# Patient Record
Sex: FEMALE | Race: WHITE | NOT HISPANIC OR LATINO | URBAN - METROPOLITAN AREA
[De-identification: names, ages, dates, MRNs, and addresses within clinical notes are randomized per-mention and may not be internally consistent; named-entity substitution may affect disease eponyms.]

---

## 2017-01-12 ENCOUNTER — OFFICE VISIT (OUTPATIENT)
Dept: FAMILY MEDICINE | Facility: CLINIC | Age: 47
End: 2017-01-12
Payer: COMMERCIAL

## 2017-01-12 VITALS
HEART RATE: 79 BPM | SYSTOLIC BLOOD PRESSURE: 124 MMHG | DIASTOLIC BLOOD PRESSURE: 82 MMHG | OXYGEN SATURATION: 97 % | TEMPERATURE: 97.1 F

## 2017-01-12 DIAGNOSIS — H54.7 BLIND: Primary | ICD-10-CM

## 2017-01-12 PROCEDURE — 99213 OFFICE O/P EST LOW 20 MIN: CPT | Performed by: FAMILY MEDICINE

## 2017-01-12 NOTE — PROGRESS NOTES
SUBJECTIVE:                                                    Tresa Orellana is a 46 year old female who presents to clinic today for the following health issues:      Patient is here to have forms filled out for [employer].    She is legally blind in both eyes and would have trouble with public transportation identifying correct routes and unfamiliar surroundings. She uses a service dog and also a white cane. She is completely blind in one eye and has vision of 10/800 in  the other eye.    Problem list and histories reviewed & adjusted, as indicated.  Additional history: as documented    Patient Active Problem List   Diagnosis     CARDIOVASCULAR SCREENING; LDL GOAL LESS THAN 160     Eczema     Chronic rhinitis     Seasonal allergies     Family history of ischemic heart disease     Legal blindness, ou     Thyroid nodules     Iron deficiency anemia due to chronic blood loss     Obesity (BMI 35.0-39.9 without comorbidity) (H)     Chalazion, right     Past Surgical History   Procedure Laterality Date     C corneal transplant,lamellar  18 mos     rejected     Bilat knee surgery torn ligaments  age 15     Hysterectomy, candie  7/29/2014     CANDIE/BS  ovaries conserved.       Social History   Substance Use Topics     Smoking status: Never Smoker      Smokeless tobacco: Never Used     Alcohol Use: Yes      Comment: occassional     Family History   Problem Relation Age of Onset     Cardiovascular Mother 63     mi         Current Outpatient Prescriptions   Medication Sig Dispense Refill     triamcinolone (KENALOG) 0.1 % cream Apply to affected area twice daily as needed 30 g 2     IBUPROFEN PO Take 200 mg by mouth 3 times daily as needed        Fexofenadine HCl (ALLEGRA PO) Take by mouth daily       [DISCONTINUED] predniSONE (DELTASONE) 20 MG tablet Take 1 tablet by mouth 2 times daily. 10 tablet 0     Allergies   Allergen Reactions     Amoxicillin      Codeine      Penicillins        ROS:  Noncontributory except as  above    OBJECTIVE:                                                    /82 mmHg  Pulse 79  Temp(Src) 97.1  F (36.2  C) (Oral)  Ht   Wt   SpO2 97%  LMP 06/22/2014  There is no weight on file to calculate BMI.  GENERAL: alert, no distress and over weight  EYES: she is blind    Diagnostic Test Results:  none      ASSESSMENT/PLAN:                                                        ICD-10-CM    1. Legal blindness, ou H54.0      Her [employer] forms were completed for her at this visit with her assistance  Recheck prn    15 minute visit with over half the time spent completing paperwork and/or coordinating care      Aneesh Castelan MD  [facility]

## 2017-01-12 NOTE — NURSING NOTE
"Chief Complaint   Patient presents with     Forms     Recertification forms, [employer]     Health Maintenance     Flu shot       Initial /82 mmHg  Pulse 79  Temp(Src) 97.1  F (36.2  C) (Oral)  Ht   Wt   SpO2 97%  LMP 06/22/2014 Estimated body mass index is 38.55 kg/(m^2) as calculated from the following:    Height as of 4/12/16: 5' 4.5\" (1.638 m).    Weight as of 4/12/16: 228 lb (103.42 kg).  BP completed using cuff size: small regular right wrist  Karina Tirado CMA       "

## 2017-01-12 NOTE — MR AVS SNAPSHOT
After Visit Summary   1/12/2017    Tresa Orellana    MRN: [id number]           Patient Information     Date Of Birth          1970        Visit Information        Provider Department      1/12/2017 10:40 AM Aneesh Castelan MD [facility]        Today's Diagnoses     Legal blindness, ou    -  1        Follow-ups after your visit        Who to contact     If you have questions or need follow up information about today's clinic visit or your schedule please contact [facility] directly at [phone].  Normal or non-critical lab and imaging results will be communicated to you by MyChart, letter or phone within 4 business days after the clinic has received the results. If you do not hear from us within 7 days, please contact the clinic through [employer] or phone. If you have a critical or abnormal lab result, we will notify you by phone as soon as possible.  Submit refill requests through [employer] or call your pharmacy and they will forward the refill request to us. Please allow 3 business days for your refill to be completed.          Additional Information About Your Visit        MyChart Information     [employer] gives you secure access to your electronic health record. If you see a primary care provider, you can also send messages to your care team and make appointments. If you have questions, please call your primary care clinic.  If you do not have a primary care provider, please call [phone] and they will assist you.        Care EveryWhere ID     This is your Care EveryWhere ID. This could be used by other organizations to access your [facility] medical records  [id number]        Your Vitals Were     Pulse Temperature Pulse Oximetry Last Period          79 97.1  F (36.2  C) (Oral) 97% 06/22/2014         Blood Pressure from Last 3 Encounters:   01/12/17 124/82   04/12/16 123/79   08/26/14 125/81    Weight from Last 3 Encounters:   04/12/16 228 lb  (103.42 kg)   08/26/14 209 lb (94.802 kg)   07/25/14 207 lb (93.895 kg)              Today, you had the following     No orders found for display       Primary Care Provider Office Phone # Fax #    Aneesh Castelan [phone]       [facility] [address]  [facility] [address]        Thank you!     Thank you for choosing [facility]  for your care. Our goal is always to provide you with excellent care. Hearing back from our patients is one way we can continue to improve our services. Please take a few minutes to complete the written survey that you may receive in the mail after your visit with us. Thank you!             Your Updated Medication List - Protect others around you: Learn how to safely use, store and throw away your medicines at www.disposemymeds.org.          This list is accurate as of: 1/12/17 11:14 AM.  Always use your most recent med list.                   Brand Name Dispense Instructions for use    ALLEGRA PO      Take by mouth daily       IBUPROFEN PO      Take 200 mg by mouth 3 times daily as needed       triamcinolone 0.1 % cream    KENALOG    30 g    Apply to affected area twice daily as needed

## 2018-11-21 ENCOUNTER — OFFICE VISIT (OUTPATIENT)
Dept: FAMILY MEDICINE | Facility: CLINIC | Age: 48
End: 2018-11-21
Payer: COMMERCIAL

## 2018-11-21 VITALS
HEIGHT: 65 IN | HEART RATE: 73 BPM | BODY MASS INDEX: 38.49 KG/M2 | OXYGEN SATURATION: 96 % | SYSTOLIC BLOOD PRESSURE: 126 MMHG | TEMPERATURE: 97.6 F | DIASTOLIC BLOOD PRESSURE: 83 MMHG | WEIGHT: 231 LBS

## 2018-11-21 DIAGNOSIS — H54.7 BLIND: ICD-10-CM

## 2018-11-21 DIAGNOSIS — Z23 NEED FOR PROPHYLACTIC VACCINATION WITH TETANUS-DIPHTHERIA (TD): ICD-10-CM

## 2018-11-21 DIAGNOSIS — L30.9 ECZEMA, UNSPECIFIED TYPE: ICD-10-CM

## 2018-11-21 DIAGNOSIS — E04.1 THYROID NODULE: ICD-10-CM

## 2018-11-21 DIAGNOSIS — Z23 NEED FOR PROPHYLACTIC VACCINATION AND INOCULATION AGAINST INFLUENZA: ICD-10-CM

## 2018-11-21 DIAGNOSIS — Z11.4 SCREENING FOR HIV (HUMAN IMMUNODEFICIENCY VIRUS): ICD-10-CM

## 2018-11-21 DIAGNOSIS — M25.571 PAIN IN JOINT, ANKLE AND FOOT, RIGHT: ICD-10-CM

## 2018-11-21 DIAGNOSIS — E66.9 OBESITY (BMI 35.0-39.9 WITHOUT COMORBIDITY): ICD-10-CM

## 2018-11-21 DIAGNOSIS — Z00.00 ROUTINE GENERAL MEDICAL EXAMINATION AT A HEALTH CARE FACILITY: Primary | ICD-10-CM

## 2018-11-21 LAB
CHOLEST SERPL-MCNC: 179 MG/DL
ERYTHROCYTE [DISTWIDTH] IN BLOOD BY AUTOMATED COUNT: 13.3 % (ref 10–15)
GLUCOSE SERPL-MCNC: 82 MG/DL (ref 70–99)
HBA1C MFR BLD: 4.9 % (ref 0–5.6)
HCT VFR BLD AUTO: 43.6 % (ref 35–47)
HDLC SERPL-MCNC: 68 MG/DL
HGB BLD-MCNC: 14.5 G/DL (ref 11.7–15.7)
LDLC SERPL CALC-MCNC: 97 MG/DL
MCH RBC QN AUTO: 28.7 PG (ref 26.5–33)
MCHC RBC AUTO-ENTMCNC: 33.3 G/DL (ref 31.5–36.5)
MCV RBC AUTO: 86 FL (ref 78–100)
NONHDLC SERPL-MCNC: 111 MG/DL
PLATELET # BLD AUTO: 230 10E9/L (ref 150–450)
RBC # BLD AUTO: 5.06 10E12/L (ref 3.8–5.2)
TRIGL SERPL-MCNC: 71 MG/DL
TSH SERPL DL<=0.005 MIU/L-ACNC: 1.94 MU/L (ref 0.4–4)
WBC # BLD AUTO: 5.7 10E9/L (ref 4–11)

## 2018-11-21 PROCEDURE — 83036 HEMOGLOBIN GLYCOSYLATED A1C: CPT | Performed by: FAMILY MEDICINE

## 2018-11-21 PROCEDURE — 87389 HIV-1 AG W/HIV-1&-2 AB AG IA: CPT | Performed by: FAMILY MEDICINE

## 2018-11-21 PROCEDURE — 85027 COMPLETE CBC AUTOMATED: CPT | Performed by: FAMILY MEDICINE

## 2018-11-21 PROCEDURE — 36415 COLL VENOUS BLD VENIPUNCTURE: CPT | Performed by: FAMILY MEDICINE

## 2018-11-21 PROCEDURE — 90472 IMMUNIZATION ADMIN EACH ADD: CPT | Performed by: FAMILY MEDICINE

## 2018-11-21 PROCEDURE — 99213 OFFICE O/P EST LOW 20 MIN: CPT | Mod: 25 | Performed by: FAMILY MEDICINE

## 2018-11-21 PROCEDURE — 82947 ASSAY GLUCOSE BLOOD QUANT: CPT | Performed by: FAMILY MEDICINE

## 2018-11-21 PROCEDURE — 90715 TDAP VACCINE 7 YRS/> IM: CPT | Performed by: FAMILY MEDICINE

## 2018-11-21 PROCEDURE — 84443 ASSAY THYROID STIM HORMONE: CPT | Performed by: FAMILY MEDICINE

## 2018-11-21 PROCEDURE — 90471 IMMUNIZATION ADMIN: CPT | Performed by: FAMILY MEDICINE

## 2018-11-21 PROCEDURE — 80061 LIPID PANEL: CPT | Performed by: FAMILY MEDICINE

## 2018-11-21 PROCEDURE — 90686 IIV4 VACC NO PRSV 0.5 ML IM: CPT | Performed by: FAMILY MEDICINE

## 2018-11-21 PROCEDURE — 99396 PREV VISIT EST AGE 40-64: CPT | Mod: 25 | Performed by: FAMILY MEDICINE

## 2018-11-21 RX ORDER — TRIAMCINOLONE ACETONIDE 1 MG/G
CREAM TOPICAL
Qty: 30 G | Refills: 2 | Status: SHIPPED | OUTPATIENT
Start: 2018-11-21 | End: 2018-12-18

## 2018-11-21 NOTE — PROGRESS NOTES
SUBJECTIVE:   CC: Tresa Orellana is an 48 year old woman who presents for a preventive health visit and to address a couple of baseline health conditions.     Healthy Habits:    Do you get at least three servings of calcium containing foods daily (dairy, green leafy vegetables, etc.)? yes    Amount of exercise or daily activities, outside of work: walking    Problems taking medications regularly not applicable    Medication side effects: No    Have you had an eye exam in the past two years? no    Do you see a dentist twice per year? Once a year    Do you have sleep apnea, excessive snoring or daytime drowsiness?no    Other concerns:   Right ankle is sometimes painful and a little swollen. She banged her right foot and ankle against a [employer] reader a while back and it became quickly swollen then.  She can walk on it okay, but it sometimes feels a little sore.  She does a lot of walking to get around because she is not able to drive given her blindness.    She still uses triamcinolone cream periodically behind her ears for eczema and requests a refill on that.    Today's PHQ-2 Score:   PHQ-2 ( 1999 Pf[employer]) 11/21/2018 4/12/2016   Q1: Little interest or pleasure in doing things 0 0   Q2: Feeling down, depressed or hopeless 0 0   PHQ-2 Score 0 0   Q1: Little interest or pleasure in doing things - -   Q2: Feeling down, depressed or hopeless - -   PHQ-2 Score - -       Abuse: Current or Past(Physical, Sexual or Emotional)- No  Do you feel safe in your environment - Yes    Social History   Substance Use Topics     Smoking status: Never Smoker     Smokeless tobacco: Never Used     Alcohol use Yes      Comment: occassional     If you drink alcohol do you typically have >3 drinks per day or >7 drinks per week? No                     Reviewed orders with patient.  Reviewed health maintenance and updated orders accordingly - Yes  Patient Active Problem List   Diagnosis     Eczema     Chronic rhinitis     Seasonal allergies      Family history of ischemic heart disease     Legal blindness, ou     Thyroid nodules     Obesity (BMI 35.0-39.9 without comorbidity)     Past Surgical History:   Procedure Laterality Date     bilat knee surgery torn ligaments  age 15     C CORNEAL TRANSPLANT,LAMELLAR  18 mos    rejected     HYSTERECTOMY, [facility]  7/29/2014    CANDIE/BS  ovaries conserved.       Social History   Substance Use Topics     Smoking status: Never Smoker     Smokeless tobacco: Never Used     Alcohol use Yes      Comment: occassional     Family History   Problem Relation Age of Onset     Cardiovascular Mother 63     mi         Current Outpatient Prescriptions   Medication Sig Dispense Refill     IBUPROFEN PO Take 200 mg by mouth 3 times daily as needed        triamcinolone (KENALOG) 0.1 % cream Apply to affected area twice daily as needed 30 g 2     Fexofenadine HCl (ALLEGRA PO) Take by mouth daily       [DISCONTINUED] predniSONE (DELTASONE) 20 MG tablet Take 1 tablet by mouth 2 times daily. 10 tablet 0     Allergies   Allergen Reactions     Amoxicillin      Codeine      Penicillins        Patient over age 50, mutual decision to screen reflected in health maintenance.    Pertinent mammograms are reviewed under the imaging tab.  History of abnormal Pap smear: Status post benign hysterectomy. Health Maintenance and Surgical History updated.  PAP / HPV 6/27/2014 6/2/2011   PAP NIL NIL     Reviewed and updated as needed this visit by clinical staff  Tobacco  Allergies  Meds  Med Hx  Surg Hx  Fam Hx  Soc Hx        Reviewed and updated as needed this visit by Provider            ROS:  CONSTITUTIONAL: NEGATIVE for fever, chills, change in weight  INTEGUMENTARY/SKIN: See above for eczema behind the ears  EYES: She is legally blind  ENT: NEGATIVE for ear, mouth and throat problems  RESP: NEGATIVE for significant cough or SOB  BREAST: NEGATIVE for masses, tenderness or discharge  CV: NEGATIVE for chest pain, palpitations or peripheral edema  GI:  "NEGATIVE for nausea, abdominal pain, heartburn, or change in bowel habits  : NEGATIVE for unusual urinary or vaginal symptoms. No vaginal bleeding.  MUSCULOSKELETAL: See above  NEURO: NEGATIVE for weakness, dizziness or paresthesias  PSYCHIATRIC: NEGATIVE for changes in mood or affect     As a part of this visit, I reviewed her old records.  In August 2013 she had a thyroid ultrasound.  She was seeing Dr. Lowery at the time.  2 nodules were found.  About that same time she broke her foot and was seen by orthopedics.  The thyroid nodules were \"put on the back burner\", but  it does not look like the thyroid nodules were ever really followed up on.    OBJECTIVE:   /83 (BP Location: Right arm, Patient Position: Chair, Cuff Size: Child)  Pulse 73  Temp 97.6  F (36.4  C) (Oral)  Ht 5' 4.57\" (1.64 m)  Wt 231 lb (104.8 kg)  LMP 06/22/2014  SpO2 96%  BMI 38.96 kg/m2  EXAM:  GENERAL: alert, no distress and obese  EYES: She has blind  HENT: ear canals and TM's normal, nose and mouth without ulcers or lesions  NECK: no adenopathy, no asymmetry, masses, or scars.  Generous sized left lobe of thyroid.  RESP: lungs clear to auscultation - no rales, rhonchi or wheezes  BREAST: normal without masses, tenderness or nipple discharge and no palpable axillary masses or adenopathy  CV: regular rate and rhythm, normal S1 S2, no S3 or S4, no murmur, click or rub, no peripheral edema and peripheral pulses strong  ABDOMEN: soft, nontender, no hepatosplenomegaly, no masses   MS: no gross musculoskeletal defects noted, no edema  SKIN: She has some mild erythematous flaky/scaly areas behind her ears  NEURO: Normal strength and tone, mentation intact and speech normal  PSYCH: mentation appears normal, affect normal/bright    Diagnostic Test Results:  none     ASSESSMENT/PLAN:       ICD-10-CM    1. Routine general medical examination at a health care facility Z00.00 GLUCOSE     CBC with platelets     Hemoglobin A1c     Lipid panel " "reflex to direct LDL Fasting     MA Screening Digital Bilateral   2. Thyroid nodules E04.1 TSH with free T4 reflex     US Thyroid   3. Eczema, unspecified type L30.9 triamcinolone (KENALOG) 0.1 % cream   4. Legal blindness, ou H54.7    5. Obesity (BMI 35.0-39.9 without comorbidity) E66.9    6. Pain in joint, ankle and foot, right M25.571    7. Screening for HIV (human immunodeficiency virus) Z11.4 HIV Screening   8. Need for prophylactic vaccination with tetanus-diphtheria (Td) Z23      ADMIN VACCINE, FIRST     TDAP VACCINE (ADACEL)   9. Need for prophylactic vaccination and inoculation against influenza Z23 FLU VACCINE, SPLIT VIRUS, IM (QUADRIVALENT) [23876]- >3 YRS     Vaccine Administration, Initial [56573]     VACCINE ADMINISTRATION, EACH ADDITIONAL     Blood pressure and other vital signs look fine  We will check fasting labs today as above  We will give her a flu shot and tetanus booster  We will get her set up for a screening mammogram  Discussed having her undergo a colonoscopy when she turns age 49  I refilled her triamcinolone cream  Discussed some exercises for her right foot and ankle  I also discussed her history of thyroid nodules          I suspect they are benign given the 5-year span since they were last looked at, but we will check a repeat thyroid ultrasound and also check a TSH to further evaluate them         Discussed possible role for fine-needle aspiration if they are indeterminate    Plan a tentative recheck in 1 year, or sooner prn    COUNSELING:   Reviewed preventive health counseling, as reflected in patient instructions       Regular exercise       Healthy diet/nutrition       Immunizations    Vaccinated for: Influenza and TDAP          BP Readings from Last 1 Encounters:   11/21/18 126/83     Estimated body mass index is 38.96 kg/(m^2) as calculated from the following:    Height as of this encounter: 5' 4.57\" (1.64 m).    Weight as of this encounter: 231 lb (104.8 kg).      Weight " management plan: Discussed healthy diet and exercise guidelines and patient will follow up in 12 months in clinic to re-evaluate.     reports that she has never smoked. She has never used smokeless tobacco.      Counseling Resources:  ATP IV Guidelines  Pooled Cohorts Equation Calculator  Breast Cancer Risk Calculator  FRAX Risk Assessment  ICSI Preventive Guidelines  Dietary Guidelines for Americans, 2010  USDA's MyPlate  ASA Prophylaxis  Lung CA Screening    Aneesh Castelan MD  [facility]    Injectable Influenza Immunization Documentation    1.  Is the person to be vaccinated sick today?   No    2. Does the person to be vaccinated have an allergy to a component   of the vaccine?   No  Egg Allergy Algorithm Link    3. Has the person to be vaccinated ever had a serious reaction   to influenza vaccine in the past?   No    4. Has the person to be vaccinated ever had Guillain-Barré syndrome?   No    Form completed by Patient  Vaccine information supplied.  Due to injection administration, patient instructed to remain in clinic for 15 minutes  afterwards, and to report any adverse reaction to me immediately.

## 2018-11-21 NOTE — NURSING NOTE
Screening Questionnaire for Adult Immunization    Are you sick today?   No   Do you have allergies to medications, food, a vaccine component or latex?   No   Have you ever had a serious reaction after receiving a vaccination?   No   Do you have a long-term health problem with heart disease, lung disease, asthma, kidney disease, metabolic disease (e.g. diabetes), anemia, or other blood disorder?   No   Do you have cancer, leukemia, HIV/AIDS, or any other immune system problem?   No   In the past 3 months, have you taken medications that affect  your immune system, such as prednisone, other steroids, or anticancer drugs; drugs for the treatment of rheumatoid arthritis, Crohn s disease, or psoriasis; or have you had radiation treatments?   No   Have you had a seizure, or a brain or other nervous system problem?   No   During the past year, have you received a transfusion of blood or blood     products, or been given immune (gamma) globulin or antiviral drug?   No   For women: Are you pregnant or is there a chance you could become        pregnant during the next month?   No   Have you received any vaccinations in the past 4 weeks?   No     Immunization questionnaire answers were all negative.        Per orders of Dr. Castelan, injection of Tdap given by Karina Tirado. Patient instructed to remain in clinic for 15 minutes afterwards, and to report any adverse reaction to me immediately.  Vaccine information supplied.    Screening performed by Karina Tirado on 11/21/2018 at 10:26 AM.

## 2018-11-21 NOTE — MR AVS SNAPSHOT
After Visit Summary   11/21/2018    Tresa Orellana    MRN: [id number]           Patient Information     Date Of Birth          1970        Visit Information        Provider Department      11/21/2018 9:40 AM Aneesh Castelan MD [facility]        Today's Diagnoses     Routine general medical examination at a health care facility    -  1    Thyroid nodules        Eczema, unspecified type        Legal blindness, ou        Obesity (BMI 35.0-39.9 without comorbidity)        Pain in joint, ankle and foot, right        Screening for HIV (human immunodeficiency virus)        Need for prophylactic vaccination with tetanus-diphtheria (Td)        Need for prophylactic vaccination and inoculation against influenza          Care Instructions      Preventive Health Recommendations  Female Ages 40 to 49    Yearly exam:     See your health care provider every year in order to  1. Review health changes.   2. Discuss preventive care.    3. Review your medicines if your doctor prescribed any.      Get a Pap test every three years (unless you have an abnormal result and your provider advises testing more often).      If you get Pap tests with HPV test, you only need to test every 5 years, unless you have an abnormal result. You do not need a Pap test if your uterus was removed (hysterectomy) and you have not had cancer.      You should be tested each year for STDs (sexually transmitted diseases), if you're at risk.     Ask your doctor if you should have a mammogram.      Have a colonoscopy (test for colon cancer) if someone in your family has had colon cancer or polyps before age 50.       Have a cholesterol test every 5 years.       Have a diabetes test (fasting glucose) after age 45. If you are at risk for diabetes, you should have this test every 3 years.    Shots: Get a flu shot each year. Get a tetanus shot every 10 years.     Nutrition:     Eat at least 5 servings of fruits and  vegetables each day.    Eat whole-grain bread, whole-wheat pasta and brown rice instead of white grains and rice.    Get adequate Calcium and Vitamin D.      Lifestyle    Exercise at least 150 minutes a week (an average of 30 minutes a day, 5 days a week). This will help you control your weight and prevent disease.    Limit alcohol to one drink per day.    No smoking.     Wear sunscreen to prevent skin cancer.    See your dentist every six months for an exam and cleaning.          Follow-ups after your visit        Follow-up notes from your care team     Return in about 1 year (around 11/21/2019).      Your next 10 appointments already scheduled     Nov 28, 2018  9:00 AM CST   US THYROID with FKUS1   [facility]    [address] [place] [address] [phone]           How do I prepare for my exam? (Food and drink instructions) No Food and Drink Restrictions.  How do I prepare for my exam? (Other instructions) You do not need to do anything special to prepare for your exam.  What should I wear: Wear comfortable clothes.  How long does the exam take: Most ultrasounds take 30 to 60 minutes.  What should I bring: Bring a list of your medicines, including vitamins, minerals and over-the-counter drugs. It is safest to leave personal items at home.  Do I need a :  No  is needed.  What do I need to tell my doctor: Tell your doctor about any allergies you may have.  What should I do after the exam: No restrictions, You may resume normal activities.  What is this test: An ultrasound uses sound waves to make pictures of the body. Sound waves do not cause pain. The only discomfort may be the pressure of the wand against your skin or full bladder.  Who should I call with questions: If you have any questions, please call the Imaging Department where you will have your exam. Directions, parking instructions, and other information is available on our website,  [email or web address].            Dec 03, 2018  8:30 AM CST   MA SCREENING DIGITAL BILATERAL with CPMA1   [facility] ([facility]    [address]  [facility] [id number] [phone]           How do I prepare for my exam? (Food and drink instructions) No Food and Drink Restrictions.  How do I prepare for my exam? (Other instructions) Do not use any powder, lotion or deodorant under your arms or on your breast. If you do, we will ask you to remove it before your exam.  What should I wear: Wear comfortable, two-piece clothing.  How long does the exam take: Most scans will take 15 minutes.  What should I bring: Bring any previous mammograms from other facilities or have them mailed to the breast center.  Do I need a :  No  is needed.  What do I need to tell my doctor: If you have any allergies, tell your care team.  What should I do after the exam: No restrictions, You may resume normal activities.  What is this test: This test is an x-ray of the breast to look for breast disease. The breast is pressed between two plates to flatten and spread the tissue. An X-ray is taken of the breast from different angles.  Who should I call with questions: If you have any questions, please call the Imaging Department where you will have your exam. Directions, parking instructions, and other information is available on our website, [email or web address].  Other information about my exam Three-dimensional (3D) mammograms are available at [facility] locations in [facility], [facility], [facility], [place], [facility] and [place]. [facility] locations include [place] and the [facility] in [place].  Benefits of 3D mammograms include * Improved rate of cancer detection * Decreases your chance of having to go back for more tests, which means fewer: * \"False-positive\" results (This means that there is an abnormal area but it " isn't cancer.) * Invasive testing procedures, such as a biopsy or surgery * Can provide clearer images of the breast if you have dense breast tissue.  *3D mammography is an optional exam that anyone can have with a 2D mammogram. It doesn't replace or take the place of a 2D mammogram. 2D mammograms remain an effective screening test for all women.  Not all insurance companies cover the cost of a 3D mammogram. Check with your insurance.              Future tests that were ordered for you today     Open Future Orders        Priority Expected Expires Ordered    US Thyroid Routine  11/21/2019 11/21/2018    MA Screening Digital Bilateral Routine  11/21/2019 11/21/2018            Who to contact     If you have questions or need follow up information about today's clinic visit or your schedule please contact [facility] directly at [phone].  Normal or non-critical lab and imaging results will be communicated to you by Uzabasehart, letter or phone within 4 business days after the clinic has received the results. If you do not hear from us within 7 days, please contact the clinic through AI Exchanget or phone. If you have a critical or abnormal lab result, we will notify you by phone as soon as possible.  Submit refill requests through Stackdriver or call your pharmacy and they will forward the refill request to us. Please allow 3 business days for your refill to be completed.          Additional Information About Your Visit        Stackdriver Information     Stackdriver gives you secure access to your electronic health record. If you see a primary care provider, you can also send messages to your care team and make appointments. If you have questions, please call your primary care clinic.  If you do not have a primary care provider, please call [phone] and they will assist you.        Care EveryWhere ID     This is your Care EveryWhere ID. This could be used by other organizations to access your [facility]  "records  [id number]        Your Vitals Were     Pulse Temperature Height Last Period Pulse Oximetry BMI (Body Mass Index)    73 97.6  F (36.4  C) (Oral) 5' 4.57\" (1.64 m) 06/22/2014 96% 38.96 kg/m2       Blood Pressure from Last 3 Encounters:   11/21/18 126/83   01/12/17 124/82   04/12/16 123/79    Weight from Last 3 Encounters:   11/21/18 231 lb (104.8 kg)   04/12/16 228 lb (103.4 kg)   08/26/14 209 lb (94.8 kg)              We Performed the Following          ADMIN VACCINE, FIRST     CBC with platelets     FLU VACCINE, SPLIT VIRUS, IM (QUADRIVALENT) [08122]- >3 YRS     GLUCOSE     Hemoglobin A1c     HIV Screening     Lipid panel reflex to direct LDL Fasting     TDAP VACCINE (ADACEL)     TSH with free T4 reflex     VACCINE ADMINISTRATION, EACH ADDITIONAL     Vaccine Administration, Initial [15448]          Where to get your medicines      These medications were sent to [facility] [address]  [address] [facility] [address]     Phone:  [phone]     triamcinolone 0.1 % cream          Primary Care Provider Office Phone # Fax #    Aneesh Castelan [phone]       [address] [facility] [address]        Equal Access to Services     ALEJANDRO RICHARDSON : Hadii lilliana dill hadasho Sotrevali, waaxda luqadaha, qaybta kaalmada adeegyada, darrell garnett. So [facility] [phone].    ATENCIÓN: Si habla español, tiene a santos disposición servicios gratuitos de asistencia lingüística. Trip al [phone].    We comply with applicable federal civil rights laws and [place] laws. We do not discriminate on the basis of race, color, national origin, age, disability, sex, sexual orientation, or gender identity.            Thank you!     Thank you for choosing [facility]  for your care. Our goal is always to provide you with excellent care. Hearing back from our patients is one way we can " continue to improve our services. Please take a few minutes to complete the written survey that you may receive in the mail after your visit with us. Thank you!             Your Updated Medication List - Protect others around you: Learn how to safely use, store and throw away your medicines at www.disposemymeds.org.          This list is accurate as of 11/21/18 10:55 AM.  Always use your most recent med list.                   Brand Name Dispense Instructions for use Diagnosis    ALLEGRA PO      Take by mouth daily        IBUPROFEN PO      Take 200 mg by mouth 3 times daily as needed        triamcinolone 0.1 % cream    KENALOG    30 g    Apply to affected area twice daily as needed    Eczema, unspecified type

## 2018-11-23 LAB — HIV 1+2 AB+HIV1 P24 AG SERPL QL IA: NONREACTIVE

## 2018-11-23 NOTE — PROGRESS NOTES
Tresa,  These lab results are all nice and normal including diabetes testing, thyroid test, HIV test, cholesterol values, and blood counts.  I will let you know how your thyroid ultrasound test turns out next week after it is done.    Aneesh Castelan MD

## 2018-11-28 ENCOUNTER — RADIANT APPOINTMENT (OUTPATIENT)
Dept: ULTRASOUND IMAGING | Facility: CLINIC | Age: 48
End: 2018-11-28
Attending: FAMILY MEDICINE
Payer: COMMERCIAL

## 2018-11-28 ENCOUNTER — TELEPHONE (OUTPATIENT)
Dept: FAMILY MEDICINE | Facility: CLINIC | Age: 48
End: 2018-11-28

## 2018-11-28 DIAGNOSIS — E04.1 THYROID NODULE: ICD-10-CM

## 2018-11-28 DIAGNOSIS — E04.2 MULTINODULAR THYROID: Primary | ICD-10-CM

## 2018-11-28 PROCEDURE — 76536 US EXAM OF HEAD AND NECK: CPT

## 2018-11-28 NOTE — TELEPHONE ENCOUNTER
I spoke with the patient by phone this afternoon and informed her of her thyroid ultrasound results showing multiple nodules.  I will refer her to Dr. Pimentel of our general surgery department in [place] for further evaluation of these nodules.

## 2018-11-28 NOTE — PROGRESS NOTES
Tresa,  As we discussed by phone just now, your thyroid ultrasound does show a number of nodules present, most of which are solid.  I would therefore request that you see Dr. Jose Carlos Pimentel of our general surgery department at our [facility] for further evaluation of them.  You can call our [facility] at [phone] to make an appointment to see him.    Aneesh Castelan MD

## 2018-12-03 ENCOUNTER — RADIANT APPOINTMENT (OUTPATIENT)
Dept: MAMMOGRAPHY | Facility: CLINIC | Age: 48
End: 2018-12-03
Attending: FAMILY MEDICINE
Payer: COMMERCIAL

## 2018-12-03 DIAGNOSIS — Z00.00 ROUTINE GENERAL MEDICAL EXAMINATION AT A HEALTH CARE FACILITY: ICD-10-CM

## 2018-12-03 PROCEDURE — 77067 SCR MAMMO BI INCL CAD: CPT | Mod: TC

## 2018-12-04 ENCOUNTER — TELEPHONE (OUTPATIENT)
Dept: SURGERY | Facility: CLINIC | Age: 48
End: 2018-12-04

## 2018-12-04 ENCOUNTER — OFFICE VISIT (OUTPATIENT)
Dept: SURGERY | Facility: CLINIC | Age: 48
End: 2018-12-04
Payer: COMMERCIAL

## 2018-12-04 VITALS
WEIGHT: 230 LBS | SYSTOLIC BLOOD PRESSURE: 140 MMHG | OXYGEN SATURATION: 97 % | HEART RATE: 67 BPM | BODY MASS INDEX: 38.79 KG/M2 | DIASTOLIC BLOOD PRESSURE: 86 MMHG

## 2018-12-04 DIAGNOSIS — E04.1 THYROID NODULE: Primary | ICD-10-CM

## 2018-12-04 PROCEDURE — 99204 OFFICE O/P NEW MOD 45 MIN: CPT | Performed by: SURGERY

## 2018-12-04 ASSESSMENT — PAIN SCALES - GENERAL: PAINLEVEL: NO PAIN (0)

## 2018-12-04 NOTE — LETTER
12/4/2018         RE: Tresa Orellana  [address] [facility] [address]        Dear Colleague,    Thank you for referring your patient, Tresa Orellana, to the [facility]. Please see a copy of my visit note below.    I was asked to see Tresa Orellana regarding large thyroid nodule by Aneesh Castelan     Tresa Orellana is a 48 year old female with a multinodular goiter. This was found in 2013 and a repeat ultrasound was done in 2018 to follow-up. The patient reports that there has been no noticeable growth recently. There are no associated symptoms of dysphasia, dysphonia, or dyspnea. The patient denies an immediate family history of thyroid cancer, although aunt had tyroid cancer or a history of exposure to ionizing radiation. The TSH was 1.94 on 11/21/18.  An ultrasound on 11/28/2018 revealed a multinodular thyroid gland with the largest nodule in left lower lobe measuring 4.9 x 4.3 x 2.6 cm.  She is here to discuss FNA.    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    Past Surgical History:  Past Surgical History:   Procedure Laterality Date     bilat knee surgery torn ligaments  age 15     C CORNEAL TRANSPLANT,LAMELLAR  18 mos    rejected     HYSTERECTOMY, CANDIE  7/29/2014    CANDIE/BS  ovaries conserved.        Social History:  Social History     Social History     Marital status:      Spouse name: N/A     Number of children: 1     Years of education: N/A     Occupational History      Self     Social History Main Topics     Smoking status: Never Smoker     Smokeless tobacco: Never Used     Alcohol use Yes      Comment: occassional     Drug use: No     Sexual activity: Yes     Partners: Male     Birth control/  protection: Surgical     Other Topics Concern     Parent/Sibling W/ Cabg, Mi Or Angioplasty Before 65f 55m? Yes     64 mom MI     Social History Narrative    Has seeing-eye dog;  2008 [place]        Family History:  Family History   Problem Relation Age of Onset     Cardiovascular Mother 63     mi     ROS:  General: negative for fever or chills  Skin: negative except mass noted above  Ears/Nose/Throat: negative for, epistaxis, bleeding gums  Respiratory: No shortness of breath, dyspnea on exertion, cough, or hemoptysis  Cardiovascular: negative for and chest pain  Gastrointestinal: negative for, nausea, vomiting and abdominal pain  Genitourinary: negative for and frequency  Neurologic: negative for and local weakness  Hematologic/Lymphatic/Immunologic: negative for, bleeding disorder and frequent infections  Endocrine: negative for, diabetes, polydipsia and polyuria    PHYSICAL EXAMINATION: A comprehensive head and neck examination was performed. Of note, there is a palpable enlargement of left lobe of the thyroid gland. There were no other palpable masses or adenopathy.    Vitals: /86  Pulse 67  Wt 104.3 kg (230 lb)  LMP 06/22/2014  SpO2 97%  BMI 38.79 kg/m2  BMI= Body mass index is 38.79 kg/(m^2).  Constitutional: healthy, alert and no distress  Head: Normocephalic. No masses, lesions, tenderness or abnormalities  Neck: Neck supple. No adenopathy. Thyroid assymmetric, normal size.  ENT: Mucous membranes moist, no oral lesions or masses noted, no nasal lesions or masses noted, no cervical lymphadenopathy noted.  Respiratory:  Non-labored respiration  Musculoskeletal: No gross deformity  Neurologic: No focal deficits  Psychiatric: mentation appears normal and affect normal/bright  Hematologic/Lymphatic/Immunologic: No bruising noted     IMPRESSION: Multinodular thyroid gland    Plan:    Given the appearance and size of the nodules an FNA is the next step. The risks, benefits and alternatives of FNA were  discussed, including (but not limited to) pain, bleeding, infection, and the potential need for additional FNAs or surgery. We discussed the possible results and briefly what each would mean. We also briefly discussed surgery. She is agreeable to an FNA and we have taken the liberty of arranging this.     Again, thank you for allowing me to participate in the care of your patient.        Sincerely,        Jose Carlos Pimentel, DO

## 2018-12-04 NOTE — TELEPHONE ENCOUNTER
Type of surgery: US GUIDED FNA  CPT 12260,05523  Thyroid nodules [E04.1]  - Primary   Location of surgery: [facility]  Date and time of surgery: 12/17/2018 /1:15  Surgeon: JENA Pimentel  Pre-Op Appt Date: non needed in clinic procedure/ under local  Post-Op Appt Date: none needed   Packet sent out: No  Pre-cert/Authorization completed:  No pre cert needed, per [employer] online list  Date: 12/04/2018

## 2018-12-04 NOTE — MR AVS SNAPSHOT
After Visit Summary   12/4/2018    Tresa Orellana    MRN: [id number]           Patient Information     Date Of Birth          1970        Visit Information        Provider Department      12/4/2018 10:30 AM Jose Carlos Pimentel,  [facility]        Today's Diagnoses     Thyroid nodules    -  1       Follow-ups after your visit        Your next 10 appointments already scheduled     Dec 17, 2018  1:15 PM CST   PROCEDURE with Jose Carlos Pimentel DO   [facility] ([facility])    [address] [place] [id number]   [phone]            Dec 17, 2018  1:20 PM CST   US THYROID with FKUS1   [facility] ([facility])    [address] [place] [address]   [phone]           How do I prepare for my exam? (Food and drink instructions) No Food and Drink Restrictions.  How do I prepare for my exam? (Other instructions) You do not need to do anything special to prepare for your exam.  What should I wear: Wear comfortable clothes.  How long does the exam take: Most ultrasounds take 30 to 60 minutes.  What should I bring: Bring a list of your medicines, including vitamins, minerals and over-the-counter drugs. It is safest to leave personal items at home.  Do I need a :  No  is needed.  What do I need to tell my doctor: Tell your doctor about any allergies you may have.  What should I do after the exam: No restrictions, You may resume normal activities.  What is this test: An ultrasound uses sound waves to make pictures of the body. Sound waves do not cause pain. The only discomfort may be the pressure of the wand against your skin or full bladder.  Who should I call with questions: If you have any questions, please call the Imaging Department where you will have your exam. Directions, parking instructions, and other information is available on our website, [email or web address].              Who to contact      If you have questions or need follow up information about today's clinic visit or your schedule please contact [facility] directly at [phone].  Normal or non-critical lab and imaging results will be communicated to you by MyChart, letter or phone within 4 business days after the clinic has received the results. If you do not hear from us within 7 days, please contact the clinic through adjusthart or phone. If you have a critical or abnormal lab result, we will notify you by phone as soon as possible.  Submit refill requests through Red LaGoon or call your pharmacy and they will forward the refill request to us. Please allow 3 business days for your refill to be completed.          Additional Information About Your Visit        adjustharSmith Micro Software Information     Red LaGoon gives you secure access to your electronic health record. If you see a primary care provider, you can also send messages to your care team and make appointments. If you have questions, please call your primary care clinic.  If you do not have a primary care provider, please call [phone] and they will assist you.        Care EveryWhere ID     This is your Care EveryWhere ID. This could be used by other organizations to access your [facility] medical records  [id number]        Your Vitals Were     Pulse Last Period Pulse Oximetry BMI (Body Mass Index)          67 06/22/2014 97% 38.79 kg/m2         Blood Pressure from Last 3 Encounters:   12/04/18 140/86   11/21/18 126/83   01/12/17 124/82    Weight from Last 3 Encounters:   12/04/18 104.3 kg (230 lb)   11/21/18 104.8 kg (231 lb)   04/12/16 103.4 kg (228 lb)              We Performed the Following     Aura-Operative Worksheet        Primary Care Provider Office Phone # Fax #    Aneesh Castelan [phone]       [address] [facility] [address]        Equal Access to Services     ALEJANDRO RICHARDSON : Robert Juarez, vega robles, jenny العراقي  darrell fernandesalissa augustusjohnny claytonaan ah. Terrie [facility] [phone].    ATENCIÓN: Si habla elaine, tiene a santos disposición servicios gratuitos de asistencia lingüística. Trip al [phone].    We comply with applicable federal civil rights laws and [place] laws. We do not discriminate on the basis of race, color, national origin, age, disability, sex, sexual orientation, or gender identity.            Thank you!     Thank you for choosing [facility]  for your care. Our goal is always to provide you with excellent care. Hearing back from our patients is one way we can continue to improve our services. Please take a few minutes to complete the written survey that you may receive in the mail after your visit with us. Thank you!             Your Updated Medication List - Protect others around you: Learn how to safely use, store and throw away your medicines at www.disposemymeds.org.          This list is accurate as of 12/4/18 11:59 PM.  Always use your most recent med list.                   Brand Name Dispense Instructions for use Diagnosis    ALLEGRA PO      Take by mouth daily        IBUPROFEN PO      Take 200 mg by mouth 3 times daily as needed        triamcinolone 0.1 % external cream    KENALOG    30 g    Apply to affected area twice daily as needed    Eczema, unspecified type

## 2018-12-04 NOTE — PROGRESS NOTES
I was asked to see Tresa Orellana regarding large thyroid nodule by Aneesh Castelan     Tresa Orellana is a 48 year old female with a multinodular goiter. This was found in 2013 and a repeat ultrasound was done in 2018 to follow-up. The patient reports that there has been no noticeable growth recently. There are no associated symptoms of dysphasia, dysphonia, or dyspnea. The patient denies an immediate family history of thyroid cancer, although aunt had tyroid cancer or a history of exposure to ionizing radiation. The TSH was 1.94 on 11/21/18.  An ultrasound on 11/28/2018 revealed a multinodular thyroid gland with the largest nodule in left lower lobe measuring 4.9 x 4.3 x 2.6 cm.  She is here to discuss FNA.    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    Past Surgical History:  Past Surgical History:   Procedure Laterality Date     bilat knee surgery torn ligaments  age 15     C CORNEAL TRANSPLANT,LAMELLAR  18 mos    rejected     HYSTERECTOMY, CANDIE  7/29/2014    CANDIE/BS  ovaries conserved.        Social History:  Social History     Social History     Marital status:      Spouse name: N/A     Number of children: 1     Years of education: N/A     Occupational History      Self     Social History Main Topics     Smoking status: Never Smoker     Smokeless tobacco: Never Used     Alcohol use Yes      Comment: occassional     Drug use: No     Sexual activity: Yes     Partners: Male     Birth control/ protection: Surgical     Other Topics Concern     Parent/Sibling W/ Cabg, Mi Or Angioplasty Before 65f 55m? Yes     64 mom MI     Social History Narrative    Has seeing-eye dog;  2008 [place]        Family History:  Family History   Problem Relation Age of Onset      Cardiovascular Mother 63     mi     ROS:  General: negative for fever or chills  Skin: negative except mass noted above  Ears/Nose/Throat: negative for, epistaxis, bleeding gums  Respiratory: No shortness of breath, dyspnea on exertion, cough, or hemoptysis  Cardiovascular: negative for and chest pain  Gastrointestinal: negative for, nausea, vomiting and abdominal pain  Genitourinary: negative for and frequency  Neurologic: negative for and local weakness  Hematologic/Lymphatic/Immunologic: negative for, bleeding disorder and frequent infections  Endocrine: negative for, diabetes, polydipsia and polyuria    PHYSICAL EXAMINATION: A comprehensive head and neck examination was performed. Of note, there is a palpable enlargement of left lobe of the thyroid gland. There were no other palpable masses or adenopathy.    Vitals: /86  Pulse 67  Wt 104.3 kg (230 lb)  LMP 06/22/2014  SpO2 97%  BMI 38.79 kg/m2  BMI= Body mass index is 38.79 kg/(m^2).  Constitutional: healthy, alert and no distress  Head: Normocephalic. No masses, lesions, tenderness or abnormalities  Neck: Neck supple. No adenopathy. Thyroid assymmetric, normal size.  ENT: Mucous membranes moist, no oral lesions or masses noted, no nasal lesions or masses noted, no cervical lymphadenopathy noted.  Respiratory:  Non-labored respiration  Musculoskeletal: No gross deformity  Neurologic: No focal deficits  Psychiatric: mentation appears normal and affect normal/bright  Hematologic/Lymphatic/Immunologic: No bruising noted     IMPRESSION: Multinodular thyroid gland    Plan:    Given the appearance and size of the nodules an FNA is the next step. The risks, benefits and alternatives of FNA were discussed, including (but not limited to) pain, bleeding, infection, and the potential need for additional FNAs or surgery. We discussed the possible results and briefly what each would mean. We also briefly discussed surgery. She is agreeable to an FNA and we have  taken the liberty of arranging this.

## 2018-12-17 ENCOUNTER — ANCILLARY PROCEDURE (OUTPATIENT)
Dept: ULTRASOUND IMAGING | Facility: CLINIC | Age: 48
End: 2018-12-17
Payer: COMMERCIAL

## 2018-12-17 ENCOUNTER — OFFICE VISIT (OUTPATIENT)
Dept: SURGERY | Facility: CLINIC | Age: 48
End: 2018-12-17
Payer: COMMERCIAL

## 2018-12-17 VITALS — OXYGEN SATURATION: 97 % | WEIGHT: 230 LBS | HEART RATE: 74 BPM | BODY MASS INDEX: 38.79 KG/M2

## 2018-12-17 DIAGNOSIS — E04.1 THYROID NODULE: ICD-10-CM

## 2018-12-17 DIAGNOSIS — E04.1 THYROID NODULE: Primary | ICD-10-CM

## 2018-12-17 PROCEDURE — 10022 ZZHC FINE NEEDLE ASPIRATION; W/ IMAGING GUIDANCE: CPT | Performed by: SURGERY

## 2018-12-17 PROCEDURE — 88173 CYTOPATH EVAL FNA REPORT: CPT | Performed by: SURGERY

## 2018-12-17 PROCEDURE — 76942 ECHO GUIDE FOR BIOPSY: CPT | Mod: 26 | Performed by: SURGERY

## 2018-12-17 PROCEDURE — 76942 ECHO GUIDE FOR BIOPSY: CPT | Mod: TC | Performed by: SURGERY

## 2018-12-17 NOTE — LETTER
12/17/2018         RE: Tresa Orellana  [address] [facility] [address]        Dear Colleague,    Thank you for referring your patient, Tresa Orellana, to the [facility]. Please see a copy of my visit note below.    Thyroid nodule FNA.   Using an ultrasound machine multiple gray scale images of the neck were obtained in both the transverse and longitudinal planes with the patient supine in the examination chair after informed consent was obtained. This revealed 3 nodules that we would need to FNA.  The skin was prepped and a total of 3 mL of 1% lidocaine with epinephrine was injected into the planned biopsy site(s).  Using the US for needle guidance, 3 passes were made into the right mid thyroid nodule using 25g needles.   Using the US for needle guidance, 3 passes were made into the left mid thyroid nodule using 25g needles.     Using the US for needle guidance, 3 passes were made into the left superior thyroid nodule using 25g needles.      The patient tolerated the procedure well. No blood loss or complications. The slides were prepared and sent to pathology. Will call the patient with results.     Again, thank you for allowing me to participate in the care of your patient.        Sincerely,        Jose Carlos Pimentel, DO

## 2018-12-18 ENCOUNTER — OFFICE VISIT (OUTPATIENT)
Dept: FAMILY MEDICINE | Facility: CLINIC | Age: 48
End: 2018-12-18
Payer: COMMERCIAL

## 2018-12-18 VITALS
OXYGEN SATURATION: 97 % | DIASTOLIC BLOOD PRESSURE: 77 MMHG | SYSTOLIC BLOOD PRESSURE: 134 MMHG | HEART RATE: 83 BPM | TEMPERATURE: 98.2 F

## 2018-12-18 DIAGNOSIS — R10.9 FLANK PAIN: Primary | ICD-10-CM

## 2018-12-18 LAB
ALBUMIN UR-MCNC: NEGATIVE MG/DL
APPEARANCE UR: CLEAR
BILIRUB UR QL STRIP: NEGATIVE
COLOR UR AUTO: YELLOW
GLUCOSE UR STRIP-MCNC: NEGATIVE MG/DL
HGB UR QL STRIP: NEGATIVE
KETONES UR STRIP-MCNC: NEGATIVE MG/DL
LEUKOCYTE ESTERASE UR QL STRIP: NEGATIVE
NITRATE UR QL: NEGATIVE
NON-SQ EPI CELLS #/AREA URNS LPF: NORMAL /LPF
PH UR STRIP: 7 PH (ref 5–7)
RBC #/AREA URNS AUTO: NORMAL /HPF
SOURCE: NORMAL
SP GR UR STRIP: 1.01 (ref 1–1.03)
UROBILINOGEN UR STRIP-ACNC: 0.2 EU/DL (ref 0.2–1)
WBC #/AREA URNS AUTO: NORMAL /HPF

## 2018-12-18 PROCEDURE — 81001 URINALYSIS AUTO W/SCOPE: CPT | Performed by: PHYSICIAN ASSISTANT

## 2018-12-18 PROCEDURE — 99213 OFFICE O/P EST LOW 20 MIN: CPT | Performed by: PHYSICIAN ASSISTANT

## 2018-12-18 NOTE — PROGRESS NOTES
SUBJECTIVE:   Tresa Orellana is a 48 year old female who presents to clinic today for the following health issues:      Kidney Pain      Duration: 1 week    Description (location/character/radiation): Patient states she fell on ice. Pain in kidney area and there's no dysuria.    Intensity:  mild    Accompanying signs and symptoms: None    History (similar episodes/previous evaluation): None    Precipitating or alleviating factors: None    Therapies tried and outcome: Tylenol for the pain which helps a little     She is also getting surgery in [place] so she would like to get it taken care of if it is an infection.    Pain on the left flank comes and goes, dull ache. No history of kidney stones. No nausea or vomiting.   Her urine does have an odor to it.   No pain with urination.   No vaginal discharge.       Problem list and histories reviewed & adjusted, as indicated.  Additional history: as documented    Patient Active Problem List   Diagnosis     Eczema     Chronic rhinitis     Seasonal allergies     Family history of ischemic heart disease     Legal blindness, ou     Thyroid nodules     Obesity (BMI 35.0-39.9 without comorbidity)     Past Surgical History:   Procedure Laterality Date     bilat knee surgery torn ligaments  age 15     C CORNEAL TRANSPLANT,LAMELLAR  18 mos    rejected     HYSTERECTOMY, CANDIE  7/29/2014    CANDIE/BS  ovaries conserved.       Social History     Tobacco Use     Smoking status: Never Smoker     Smokeless tobacco: Never Used   Substance Use Topics     Alcohol use: Yes     Comment: occassional     Family History   Problem Relation Age of Onset     Cardiovascular Mother 63        mi           Reviewed and updated as needed this visit by clinical staff  Tobacco  Allergies  Meds  Problems  Med Hx  Surg Hx  Fam Hx  Soc Hx        Reviewed and updated as needed this visit by Provider  Tobacco  Allergies  Meds  Problems  Med Hx  Surg Hx  Fam Hx         ROS:  Constitutional, HEENT,  cardiovascular, pulmonary, gi and gu systems are negative, except as otherwise noted.    OBJECTIVE:     /77 (BP Location: Other (Comment), Patient Position: Chair, Cuff Size: Adult Regular)   Pulse 83   Temp 98.2  F (36.8  C) (Oral)   LMP 06/22/2014   SpO2 93%   There is no height or weight on file to calculate BMI.  GENERAL: healthy, alert and no distress  ABDOMEN: soft, nontender, no hepatosplenomegaly, no masses and bowel sounds normal  MS: no gross musculoskeletal defects noted, no edema- Very minimal tenderness to deep palpation at the left flank.   BACK: no CVA tenderness, no paralumbar tenderness    Diagnostic Test Results:  Results for orders placed or performed in visit on 12/18/18   UA with Microscopic reflex to Culture   Result Value Ref Range    Color Urine Yellow     Appearance Urine Clear     Glucose Urine Negative NEG^Negative mg/dL    Bilirubin Urine Negative NEG^Negative    Ketones Urine Negative NEG^Negative mg/dL    Specific Gravity Urine 1.010 1.003 - 1.035    pH Urine 7.0 5.0 - 7.0 pH    Protein Albumin Urine Negative NEG^Negative mg/dL    Urobilinogen Urine 0.2 0.2 - 1.0 EU/dL    Nitrite Urine Negative NEG^Negative    Blood Urine Negative NEG^Negative    Leukocyte Esterase Urine Negative NEG^Negative    Source Midstream Urine     WBC Urine 0 - 5 OTO5^0 - 5 /HPF    RBC Urine O - 2 OTO2^O - 2 /HPF    Squamous Epithelial /LPF Urine Few FEW^Few /LPF         ASSESSMENT/PLAN:       ICD-10-CM    1. Flank pain R10.9 UA with Microscopic reflex to Culture   Suspect MSK origin. Patient will take tylenol as needed for pain. return to clinic if worsening or new symptoms.     FUTURE APPOINTMENTS:       - Follow-up for annual visit or as needed    Hailey Kumar PA-C  [facility]

## 2018-12-19 LAB — COPATH REPORT: NORMAL

## 2019-01-08 NOTE — PROGRESS NOTES
Thyroid nodule FNA.   Using an ultrasound machine multiple gray scale images of the neck were obtained in both the transverse and longitudinal planes with the patient supine in the examination chair after informed consent was obtained. This revealed 3 nodules that we would need to FNA.  The skin was prepped and a total of 3 mL of 1% lidocaine with epinephrine was injected into the planned biopsy site(s).  Using the US for needle guidance, 3 passes were made into the right mid thyroid nodule using 25g needles.   Using the US for needle guidance, 3 passes were made into the left mid thyroid nodule using 25g needles.     Using the US for needle guidance, 3 passes were made into the left superior thyroid nodule using 25g needles.      The patient tolerated the procedure well. No blood loss or complications. The slides were prepared and sent to pathology. Will call the patient with results.

## 2019-05-07 ENCOUNTER — OFFICE VISIT (OUTPATIENT)
Dept: SURGERY | Facility: CLINIC | Age: 49
End: 2019-05-07
Payer: COMMERCIAL

## 2019-05-07 ENCOUNTER — TELEPHONE (OUTPATIENT)
Dept: SURGERY | Facility: CLINIC | Age: 49
End: 2019-05-07

## 2019-05-07 VITALS
WEIGHT: 239 LBS | HEIGHT: 64 IN | DIASTOLIC BLOOD PRESSURE: 84 MMHG | HEART RATE: 61 BPM | BODY MASS INDEX: 40.8 KG/M2 | SYSTOLIC BLOOD PRESSURE: 142 MMHG

## 2019-05-07 DIAGNOSIS — E04.1 THYROID NODULE: Primary | ICD-10-CM

## 2019-05-07 PROCEDURE — 31575 DIAGNOSTIC LARYNGOSCOPY: CPT | Performed by: SURGERY

## 2019-05-07 PROCEDURE — 99214 OFFICE O/P EST MOD 30 MIN: CPT | Mod: 25 | Performed by: SURGERY

## 2019-05-07 ASSESSMENT — MIFFLIN-ST. JEOR: SCORE: 1689.1

## 2019-05-07 NOTE — LETTER
5/7/2019         RE: Tresa Orellana  [address] [facility] [address]        Dear Colleague,    Thank you for referring your patient, Tresa Orellana, to the [facility]. Please see a copy of my visit note below.    Tresa Orellana is a 49 year old female with a large multinodular goiter. She was seen in December and an FNA of the large nodule was benign.  The patient reports that there has been some growth recently. There are no associated symptoms of dysphagia, dysphonia, or dyspnea; however she says she is noticing it more. The patient denies an immediate family history of thyroid cancer or a history of exposure to ionizing radiation; she does have an aunt who had thyroid cancer.    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    Past Surgical History:  Past Surgical History:  age 15: bilat knee surgery torn ligaments  18 mos: C CORNEAL TRANSPLANT,LAMELLAR      Comment:  rejected  7/29/2014: HYSTERECTOMY, CANDIE      Comment:  CANDIE/BS  ovaries conserved.     Social History:  Social History    Socioeconomic History      Marital status:       Spouse name: Not on file      Number of children: 1      Years of education: Not on file      Highest education level: Not on file    Occupational History        Employer: SELF    Social Needs      Financial resource strain: Not on file      Food insecurity:        Worry: Not on file        Inability: Not on file      Transportation needs:        Medical: Not on file        Non-medical: Not on file    Tobacco Use      Smoking status: Never Smoker      Smokeless tobacco: Never Used    Substance and Sexual Activity      Alcohol use: Yes        Comment: occassional      Drug use: No       "Sexual activity: Yes        Partners: Male        Birth control/protection: Surgical    Lifestyle      Physical activity:        Days per week: Not on file        Minutes per session: Not on file      Stress: Not on file    Relationships      Social connections:        Talks on phone: Not on file        Gets together: Not on file        Attends Judaism service: Not on file        Active member of club or organization: Not on file        Attends meetings of clubs or organizations: Not on file        Relationship status: Not on file      Intimate partner violence:        Fear of current or ex partner: Not on file        Emotionally abused: Not on file        Physically abused: Not on file        Forced sexual activity: Not on file    Other Topics      Concerns:        Parent/sibling w/ CABG, MI or angioplasty before 65F 55M?: Yes          64 mom MI    Social History Narrative      Has seeing-eye dog;  2008 [place]       Family History:  Review of patient's family history indicates:  Problem: Cardiovascular      Relation: Mother          Age of Onset: 63          Comment: mi      ROS:  General: negative for fever or chills  Skin: negative except mass noted above  Ears/Nose/Throat: negative for, epistaxis, bleeding gums  Respiratory: No shortness of breath, dyspnea on exertion, cough, or hemoptysis  Cardiovascular: negative for and chest pain  Gastrointestinal: negative for, nausea, vomiting and abdominal pain  Genitourinary: negative for and frequency  Neurologic: negative for and local weakness  Hematologic/Lymphatic/Immunologic: negative for, bleeding disorder and frequent infections  Endocrine: negative for, diabetes, polydipsia and polyuria    PHYSICAL EXAMINATION: A comprehensive head and neck examination was performed. Of note, there is a palpable enlargement of left lobe of the thyroid gland. There were no other palpable masses or adenopathy.        Vitals: /84   Pulse 61   Ht 1.626 m (5' 4\")   Wt 108.4 " kg (239 lb)   LMP 06/22/2014   BMI 41.02 kg/m    BMI= Body mass index is 41.02 kg/m .  Constitutional: healthy, alert and no distress  Head: Normocephalic. No masses, lesions, tenderness or abnormalities  Neck: Neck supple. No adenopathy. Thyroid asymmetric, normal size.  ENT: Mucous membranes moist, no cervical lymphadenopathy noted.  Respiratory:  Non-labored respiration  Musculoskeletal: No gross deformity  Neurologic: No focal deficits  Psychiatric: mentation appears normal and affect normal/bright  Hematologic/Lymphatic/Immunologic: No bruising noted    PROCEDURE: Preprocedure diagnosis: Goiter; Postprocedure diagnosis: Sameoral consent was obtained from the patient to Genasal nasal spray was applied topically to the nasal cavity. A fiberoptic laryngoscope was then passed through the patient's nasal cavity into their nasopharynx. This laryngoscope was then used to visualize the contents and structures of the patient's pharynx and larynx. The anatomy of the pharynx and larynx was normal. The mucosa was normal throughout. There were no masses or lesions seen. The true vocal cords were mobile bilaterally.    IMPRESSION: Multinodular goiter    RECOMMENDATION:  Given the enlarging large thyroid nodule on the left a left thyroidectomy is reasonable. Fortunately, is an excellent candidate for a left thyroidectomy with laryngeal nerve monitoring on an outpatient basis. The risks, benefits and alternatives of the surgery were discussed, including (but not limited to) nerve injury (temporary or permanent), voice changes or breathing difficulty, hypoparathyroidism (temporary or permanent), pain, bleeding, infection, scarring, persistent disease and the potential need for thyroid supplementation or additional surgeries. We also briefly discussed total thyroidectomy and surveillance of the right lobe given the nodules.   The patient is eager to proceed with surgery, and we have taken the liberty of arranging this.      Thank you for entrusting me with the care of your patient. If you have any questions or concerns I hope you will contact me.      Again, thank you for allowing me to participate in the care of your patient.        Sincerely,        Jose Carlos Pimentel, DO

## 2019-05-07 NOTE — TELEPHONE ENCOUNTER
Type of surgery: partial thyroidectomy  CPT 28074  Thyroid nodule [E04.1]  - Primary   Location of surgery: [facility]  Date and time of surgery: 6-14-19  Surgeon: Dr. Pimentel  Pre-Op Appt Date: 6-7-19  Post-Op Appt Date: 6-27-19   Packet sent out: Yes- handed to patient  Pre-cert/Authorization completed: no pre cert needed   Date: 05/07/2019    Thank you,   Hailey Ramos   [facility]  [phone]

## 2019-05-07 NOTE — PROGRESS NOTES
Tresa Orellana is a 49 year old female with a large multinodular goiter. She was seen in December and an FNA of the large nodule was benign.  The patient reports that there has been some growth recently. There are no associated symptoms of dysphagia, dysphonia, or dyspnea; however she says she is noticing it more. The patient denies an immediate family history of thyroid cancer or a history of exposure to ionizing radiation; she does have an aunt who had thyroid cancer.    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    Past Surgical History:  Past Surgical History:  age 15: bilat knee surgery torn ligaments  18 mos: C CORNEAL TRANSPLANT,LAMELLAR      Comment:  rejected  7/29/2014: HYSTERECTOMY, CANDIE      Comment:  CANDIE/BS  ovaries conserved.     Social History:  Social History    Socioeconomic History      Marital status:       Spouse name: Not on file      Number of children: 1      Years of education: Not on file      Highest education level: Not on file    Occupational History        Employer: SELF    Social Needs      Financial resource strain: Not on file      Food insecurity:        Worry: Not on file        Inability: Not on file      Transportation needs:        Medical: Not on file        Non-medical: Not on file    Tobacco Use      Smoking status: Never Smoker      Smokeless tobacco: Never Used    Substance and Sexual Activity      Alcohol use: Yes        Comment: occassional      Drug use: No      Sexual activity: Yes        Partners: Male        Birth control/protection: Surgical    Lifestyle      Physical activity:        Days per week: Not on file        Minutes per session: Not on file      Stress: Not on file    Relationships      Social  "connections:        Talks on phone: Not on file        Gets together: Not on file        Attends Temple service: Not on file        Active member of club or organization: Not on file        Attends meetings of clubs or organizations: Not on file        Relationship status: Not on file      Intimate partner violence:        Fear of current or ex partner: Not on file        Emotionally abused: Not on file        Physically abused: Not on file        Forced sexual activity: Not on file    Other Topics      Concerns:        Parent/sibling w/ CABG, MI or angioplasty before 65F 55M?: Yes          64 mom MI    Social History Narrative      Has seeing-eye dog;  2008 [place]       Family History:  Review of patient's family history indicates:  Problem: Cardiovascular      Relation: Mother          Age of Onset: 63          Comment: mi      ROS:  General: negative for fever or chills  Skin: negative except mass noted above  Ears/Nose/Throat: negative for, epistaxis, bleeding gums  Respiratory: No shortness of breath, dyspnea on exertion, cough, or hemoptysis  Cardiovascular: negative for and chest pain  Gastrointestinal: negative for, nausea, vomiting and abdominal pain  Genitourinary: negative for and frequency  Neurologic: negative for and local weakness  Hematologic/Lymphatic/Immunologic: negative for, bleeding disorder and frequent infections  Endocrine: negative for, diabetes, polydipsia and polyuria    PHYSICAL EXAMINATION: A comprehensive head and neck examination was performed. Of note, there is a palpable enlargement of left lobe of the thyroid gland. There were no other palpable masses or adenopathy.        Vitals: /84   Pulse 61   Ht 1.626 m (5' 4\")   Wt 108.4 kg (239 lb)   LMP 06/22/2014   BMI 41.02 kg/m    BMI= Body mass index is 41.02 kg/m .  Constitutional: healthy, alert and no distress  Head: Normocephalic. No masses, lesions, tenderness or abnormalities  Neck: Neck supple. No adenopathy. Thyroid " asymmetric, normal size.  ENT: Mucous membranes moist, no cervical lymphadenopathy noted.  Respiratory:  Non-labored respiration  Musculoskeletal: No gross deformity  Neurologic: No focal deficits  Psychiatric: mentation appears normal and affect normal/bright  Hematologic/Lymphatic/Immunologic: No bruising noted    PROCEDURE: Preprocedure diagnosis: Goiter; Postprocedure diagnosis: Sameoral consent was obtained from the patient to Genasal nasal spray was applied topically to the nasal cavity. A fiberoptic laryngoscope was then passed through the patient's nasal cavity into their nasopharynx. This laryngoscope was then used to visualize the contents and structures of the patient's pharynx and larynx. The anatomy of the pharynx and larynx was normal. The mucosa was normal throughout. There were no masses or lesions seen. The true vocal cords were mobile bilaterally.    IMPRESSION: Multinodular goiter    RECOMMENDATION:  Given the enlarging large thyroid nodule on the left a left thyroidectomy is reasonable. Fortunately, is an excellent candidate for a left thyroidectomy with laryngeal nerve monitoring on an outpatient basis. The risks, benefits and alternatives of the surgery were discussed, including (but not limited to) nerve injury (temporary or permanent), voice changes or breathing difficulty, hypoparathyroidism (temporary or permanent), pain, bleeding, infection, scarring, persistent disease and the potential need for thyroid supplementation or additional surgeries. We also briefly discussed total thyroidectomy and surveillance of the right lobe given the nodules.   The patient is eager to proceed with surgery, and we have taken the liberty of arranging this.     Thank you for entrusting me with the care of your patient. If you have any questions or concerns I hope you will contact me.

## 2019-05-21 NOTE — TELEPHONE ENCOUNTER
I have faxed paperwork to [facility].     Thank you,   Hailey Ramos   Referral Department  [phone]

## 2019-06-07 ENCOUNTER — OFFICE VISIT (OUTPATIENT)
Dept: FAMILY MEDICINE | Facility: CLINIC | Age: 49
End: 2019-06-07
Payer: COMMERCIAL

## 2019-06-07 ENCOUNTER — TELEPHONE (OUTPATIENT)
Dept: FAMILY MEDICINE | Facility: CLINIC | Age: 49
End: 2019-06-07

## 2019-06-07 VITALS
BODY MASS INDEX: 40.51 KG/M2 | HEART RATE: 82 BPM | DIASTOLIC BLOOD PRESSURE: 84 MMHG | SYSTOLIC BLOOD PRESSURE: 134 MMHG | TEMPERATURE: 98.5 F | OXYGEN SATURATION: 96 % | WEIGHT: 236 LBS

## 2019-06-07 DIAGNOSIS — E04.2 MULTIPLE THYROID NODULES: ICD-10-CM

## 2019-06-07 DIAGNOSIS — H54.7 BLIND: ICD-10-CM

## 2019-06-07 DIAGNOSIS — Z01.818 PREOP GENERAL PHYSICAL EXAM: Primary | ICD-10-CM

## 2019-06-07 DIAGNOSIS — E66.01 OBESITY, CLASS III, BMI 40-49.9 (MORBID OBESITY) (H): ICD-10-CM

## 2019-06-07 PROCEDURE — 99214 OFFICE O/P EST MOD 30 MIN: CPT | Performed by: FAMILY MEDICINE

## 2019-06-07 ASSESSMENT — PAIN SCALES - GENERAL: PAINLEVEL: NO PAIN (0)

## 2019-06-07 NOTE — NURSING NOTE
Discussed colonoscopy with patient and she will get in touch with us after her surgery to schedule.  Karina Tirado CMA

## 2019-06-07 NOTE — TELEPHONE ENCOUNTER
Panel Management Review      Patient has the following on her problem list: None      Composite cancer screening  Chart review shows that this patient is due/due soon for the following Colonoscopy  Summary:    Patient is due/failing the following:   COLONOSCOPY    Action needed:   Colonoscopy    Type of outreach:    Spoke to patient while in clinic on 6/7/19. She is going to wait until after her surgery and then will get in touch with us.    Questions for provider review:    None                                                                                                                                    Karina Tirado [facility]        Chart routed to Care Team .

## 2019-06-07 NOTE — PROGRESS NOTES
[address] [id number] [phone]  Dept: [phone]    PRE-OP EVALUATION:  Today's date: 2019    Tresa Orellana (: 1970) presents for pre-operative evaluation assessment as requested by Dr. Pimentel.  She requires evaluation and anesthesia risk assessment prior to undergoing surgery/procedure for treatment of thyroid nodules.    Fax number for surgical facility: [phone]  Primary Physician: Aneesh Castelan  Type of Anesthesia Anticipated: to be determined    Patient has a Health Care Directive or Living Will:  NO    Preop Questions 2019   Who is doing your surgery? Dr. Pimentel   What are you having done? Thyroid work   Date of Surgery/Procedure: 2019   Facility or Hospital where procedure/surgery will be performed: [facility]   1.  Do you have a history of Heart attack, stroke, stent, coronary bypass surgery, or other heart surgery? No   2.  Do you ever have any pain or discomfort in your chest? No   3.  Do you have a history of  Heart Failure? No   4.   Are you troubled by shortness of breath when:  walking on a level surface, or up a slight hill, or at night? No   5.  Do you currently have a cold, bronchitis or other respiratory infection? No   6.  Do you have a cough, shortness of breath, or wheezing? No   7.  Do you sometimes get pains in the calves of your legs when you walk? No   8. Do you or anyone in your family have previous history of blood clots? YES - dad   9.  Do you or does anyone in your family have a serious bleeding problem such as prolonged bleeding following surgeries or cuts? No   10. Have you ever had problems with anemia or been told to take iron pills? YES - prior to hysterectomy   11. Have you had any abnormal blood loss such as black, tarry or bloody stools, or abnormal vaginal bleeding? No   12. Have you ever had a blood transfusion? YES - in  with knee surgery   13. Have  you or any of your relatives ever had problems with anesthesia? No   14. Do you have sleep apnea, excessive snoring or daytime drowsiness? No   15. Do you have any prosthetic heart valves? No   16. Do you have prosthetic joints? No   17. Is there any chance that you may be pregnant? No     Patient needs to know what time this procure is taking place.  Candy Bryan MA      HPI:     HPI related to upcoming procedure: 49-year-old white female with a history of multiple thyroid nodules has had an enlarging left nodule. FNA was negative for malignancy.  She is in now for a left thyroidectomy.      See problem list for active medical problems.  Problems all longstanding and stable, except as noted/documented.  See ROS for pertinent symptoms related to these conditions.      MEDICAL HISTORY:     Patient Active Problem List    Diagnosis Date Noted     Obesity (BMI 35.0-39.9 without comorbidity) 04/12/2016     Priority: Medium     Thyroid nodules 08/15/2013     Priority: Medium     Problem list name updated by automated process. 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     Current  Outpatient Medications   Medication Sig Dispense Refill     Acetaminophen (TYLENOL PO)        OTC products: None, except as noted above    Allergies   Allergen Reactions     Amoxicillin      Codeine      Penicillins       Latex Allergy: NO    Social History     Tobacco Use     Smoking status: Never Smoker     Smokeless tobacco: Never Used   Substance Use Topics     Alcohol use: Yes     Comment: occassional     History   Drug Use No       REVIEW OF SYSTEMS:   CONSTITUTIONAL: NEGATIVE for fever, chills, change in weight  INTEGUMENTARY/SKIN: NEGATIVE for worrisome rashes, moles or lesions  EYES: She is blind  ENT/MOUTH: NEGATIVE for ear, mouth and throat problems  RESP: NEGATIVE for significant cough or SOB  BREAST: NEGATIVE for masses, tenderness or discharge  CV: NEGATIVE for chest pain, palpitations or peripheral edema  GI: NEGATIVE for nausea, abdominal pain, heartburn, or change in bowel habits  : NEGATIVE for frequency, dysuria, or hematuria  MUSCULOSKELETAL: NEGATIVE for significant arthralgias or myalgia  NEURO: NEGATIVE for weakness, dizziness or paresthesias  ENDOCRINE: NEGATIVE for temperature intolerance, skin/hair changes  HEME: NEGATIVE for bleeding problems  PSYCHIATRIC: NEGATIVE for changes in mood or affect    EXAM:   LMP 06/22/2014    /84 (BP Location: Right arm, Patient Position: Chair, Cuff Size: Adult Large)   Pulse 82   Temp 98.5  F (36.9  C) (Oral)   Wt 107 kg (236 lb)   LMP 06/22/2014   SpO2 96%   BMI 40.51 kg/m        GENERAL APPEARANCE: healthy, alert and no distress     EYES: She is blind     HENT: Grossly normal     NECK: Fullness/enlargement in thyroid region of left lower neck     RESP: lungs clear to auscultation - no rales, rhonchi or wheezes     CV: regular rates and rhythm, normal S1 S2, no S3 or S4 and no murmur, click or rub     ABDOMEN:  soft, nontender, no HSM or masses      MS: extremities normal- no gross deformities noted, no evidence of inflammation in joints,  FROM in all extremities.     SKIN: no suspicious lesions or rashes     NEURO: Normal strength and tone, sensory exam grossly normal, mentation intact and speech normal     PSYCH: mentation appears normal and affect normal/bright    DIAGNOSTICS:       Recent Labs   Lab Test 11/21/18  1034 04/12/16  1105  06/02/11  1015   HGB 14.5 14.8   < >  --     239   < >  --    A1C 4.9  --   --  5.6    < > = values in this interval not displayed.        IMPRESSION:   Reason for surgery/procedure: Multiple thyroid nodules  Diagnosis/reason for consult: Preoperative history and physical    The proposed surgical procedure is considered INTERMEDIATE risk.    REVISED CARDIAC RISK INDEX  The patient has the following serious cardiovascular risks for perioperative complications such as (MI, PE, VFib and 3  AV Block):  No serious cardiac risks  INTERPRETATION: 0 risks: Class I (very low risk - 0.4% complication rate)    The patient has the following additional risks for perioperative complications:  Morbid obesity  Blindness      ICD-10-CM    1. Preop general physical exam Z01.818    2. Multiple thyroid nodules E04.2    3. Obesity, Class III, BMI 40-49.9 (morbid obesity) (H) E66.01    4. Legal blindness, ou H54.7        RECOMMENDATIONS:       APPROVAL GIVEN to proceed with proposed procedure, without further diagnostic evaluation       Signed Electronically by: Aneesh Castelan MD    Copy of this evaluation report is provided to requesting physician.    [facility] Preop Guidelines    Revised Cardiac Risk Index

## 2019-06-27 ENCOUNTER — OFFICE VISIT (OUTPATIENT)
Dept: SURGERY | Facility: CLINIC | Age: 49
End: 2019-06-27
Payer: COMMERCIAL

## 2019-06-27 VITALS
HEART RATE: 84 BPM | DIASTOLIC BLOOD PRESSURE: 84 MMHG | HEIGHT: 64 IN | WEIGHT: 236 LBS | SYSTOLIC BLOOD PRESSURE: 134 MMHG | BODY MASS INDEX: 40.29 KG/M2

## 2019-06-27 DIAGNOSIS — E89.0 POSTOPERATIVE HYPOTHYROIDISM: Primary | ICD-10-CM

## 2019-06-27 PROCEDURE — 99024 POSTOP FOLLOW-UP VISIT: CPT | Performed by: SURGERY

## 2019-06-27 ASSESSMENT — MIFFLIN-ST. JEOR: SCORE: 1675.49

## 2019-06-27 NOTE — LETTER
"    6/27/2019         RE: Tresa Orellana  [address] [address]        Dear Colleague,    Thank you for referring your patient, Tresa Orellana, to the [facility]. Please see a copy of my visit note below.    General Surgery Post Op    Pt returns for follow up visit s/p left thyroidectomy on 6/14/2019.    Patient has been doing well. Pain controlled. No issues with wound healing/redness/drainage. No fevers.  Voice and swallowing are improving      Physical exam: Vitals: /84   Pulse 84   Ht 1.626 m (5' 4\")   Wt 107 kg (236 lb)   LMP 06/22/2014   BMI 40.51 kg/m     BMI= Body mass index is 40.51 kg/m .    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     Plan: Pt doing well and can follow up as needed.       Jose Carlos Pimentel, DO        Again, thank you for allowing me to participate in the care of your patient.        Sincerely,        Jose Carlos Pimentel, DO    "

## 2019-06-27 NOTE — PROGRESS NOTES
"General Surgery Post Op    Pt returns for follow up visit s/p left thyroidectomy on 6/14/2019.    Patient has been doing well. Pain controlled. No issues with wound healing/redness/drainage. No fevers.  Voice and swallowing are improving      Physical exam: Vitals: /84   Pulse 84   Ht 1.626 m (5' 4\")   Wt 107 kg (236 lb)   LMP 06/22/2014   BMI 40.51 kg/m    BMI= Body mass index is 40.51 kg/m .    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     Plan: Pt doing well and can follow up as needed.       Jose Carlos Pimentel, DO      "

## 2019-07-26 DIAGNOSIS — E89.0 POSTOPERATIVE HYPOTHYROIDISM: ICD-10-CM

## 2019-07-26 LAB — TSH SERPL DL<=0.005 MIU/L-ACNC: 3.3 MU/L (ref 0.4–4)

## 2019-07-26 PROCEDURE — 84443 ASSAY THYROID STIM HORMONE: CPT | Performed by: FAMILY MEDICINE

## 2019-07-26 PROCEDURE — 36415 COLL VENOUS BLD VENIPUNCTURE: CPT | Performed by: FAMILY MEDICINE

## 2019-10-05 ENCOUNTER — HEALTH MAINTENANCE LETTER (OUTPATIENT)
Age: 49
End: 2019-10-05

## 2019-12-18 ENCOUNTER — TELEPHONE (OUTPATIENT)
Dept: FAMILY MEDICINE | Facility: CLINIC | Age: 49
End: 2019-12-18

## 2019-12-18 ENCOUNTER — OFFICE VISIT (OUTPATIENT)
Dept: FAMILY MEDICINE | Facility: CLINIC | Age: 49
End: 2019-12-18
Payer: COMMERCIAL

## 2019-12-18 VITALS
TEMPERATURE: 97.6 F | HEIGHT: 64 IN | SYSTOLIC BLOOD PRESSURE: 133 MMHG | BODY MASS INDEX: 39.44 KG/M2 | OXYGEN SATURATION: 95 % | HEART RATE: 83 BPM | WEIGHT: 231 LBS | DIASTOLIC BLOOD PRESSURE: 83 MMHG

## 2019-12-18 DIAGNOSIS — H54.3 BLINDNESS OF BOTH EYES: ICD-10-CM

## 2019-12-18 DIAGNOSIS — E04.1 THYROID NODULE: ICD-10-CM

## 2019-12-18 DIAGNOSIS — Z12.11 SCREEN FOR COLON CANCER: ICD-10-CM

## 2019-12-18 DIAGNOSIS — Z00.00 ROUTINE GENERAL MEDICAL EXAMINATION AT A HEALTH CARE FACILITY: Primary | ICD-10-CM

## 2019-12-18 DIAGNOSIS — E89.0 S/P PARTIAL THYROIDECTOMY: ICD-10-CM

## 2019-12-18 DIAGNOSIS — E66.9 OBESITY (BMI 35.0-39.9 WITHOUT COMORBIDITY): ICD-10-CM

## 2019-12-18 PROCEDURE — 99396 PREV VISIT EST AGE 40-64: CPT | Performed by: FAMILY MEDICINE

## 2019-12-18 ASSESSMENT — MIFFLIN-ST. JEOR: SCORE: 1652.81

## 2019-12-18 NOTE — TELEPHONE ENCOUNTER
Panel Management Review      Patient has the following on her problem list: None      Composite cancer screening  Chart review shows that this patient is due/due soon for the following Colonoscopy  Summary:    Patient is due/failing the following:   COLONOSCOPY    Action needed:   Colonoscopy    Type of outreach:    Patient in clinic today 12/18/19 for physical. Discussed colonoscopy and she will schedule in January or February 2020.    Questions for provider review:    None                                                                                                                                    Karina Tirado [facility]        Chart routed to Care Team .

## 2019-12-18 NOTE — PROGRESS NOTES
SUBJECTIVE:   CC: Tresa Orellana is an 49 year old woman who presents for a preventive health visit.     Healthy Habits:    Do you get at least three servings of calcium containing foods daily (dairy, green leafy vegetables, etc.)? yes    Amount of exercise or daily activities, outside of work: 3-4 day(s) per week    Problems taking medications regularly not applicable    Medication side effects: No    Have you had an eye exam in the past two years? no    Do you see a dentist twice per year? no    Do you have sleep apnea, excessive snoring or daytime drowsiness?no    She had a partial thyroidectomy in June of this year.  She has recovered from that nicely.  She is not having any unusual neck pain.  She did have a postop thyroid blood test done which was normal.      Today's PHQ-2 Score:   PHQ-2 ( 1999 [employer]) 12/18/2019 11/21/2018   Q1: Little interest or pleasure in doing things 0 0   Q2: Feeling down, depressed or hopeless 0 0   PHQ-2 Score 0 0   Q1: Little interest or pleasure in doing things - -   Q2: Feeling down, depressed or hopeless - -   PHQ-2 Score - -       Abuse: Current or Past(Physical, Sexual or Emotional)- No  Do you feel safe in your environment? Yes        Social History     Tobacco Use     Smoking status: Never Smoker     Smokeless tobacco: Never Used   Substance Use Topics     Alcohol use: Yes     Comment: occassional     If you drink alcohol do you typically have >3 drinks per day or >7 drinks per week? No                     Reviewed orders with patient.  Reviewed health maintenance and updated orders accordingly - Yes  Patient Active Problem List   Diagnosis     Eczema     Chronic rhinitis     Seasonal allergies     Family history of ischemic heart disease     Legal blindness, ou     Thyroid nodules     Obesity (BMI 35.0-39.9 without comorbidity)     S/P partial thyroidectomy     Past Surgical History:   Procedure Laterality Date     bilat knee surgery torn ligaments  age 15     C CORNEAL  "TRANSPLANT,LAMELLAR  18 mos    rejected     HYSTERECTOMY, [facility]  7/29/2014    CANDIE/BS  ovaries conserved.     partial thryoidectomy  06/2019    by Dr. Pimentel for thyroid nodules       Social History     Tobacco Use     Smoking status: Never Smoker     Smokeless tobacco: Never Used   Substance Use Topics     Alcohol use: Yes     Comment: occassional     Family History   Problem Relation Age of Onset     Cardiovascular Mother 63        mi         Current Outpatient Medications   Medication Sig Dispense Refill     Acetaminophen (TYLENOL PO)        Allergies   Allergen Reactions     Amoxicillin      Codeine      Penicillins        Mammogram Screening: Patient over age 50, mutual decision to screen reflected in health maintenance.    Pertinent mammograms are reviewed under the imaging tab.  History of abnormal Pap smear: Status post benign hysterectomy. Health Maintenance and Surgical History updated.  PAP / HPV 6/27/2014 6/2/2011   PAP NIL NIL     Reviewed and updated as needed this visit by clinical staff  Tobacco  Allergies  Meds  Med Hx  Surg Hx  Fam Hx  Soc Hx        Reviewed and updated as needed this visit by Provider            ROS:  CONSTITUTIONAL: NEGATIVE for fever, chills, change in weight  INTEGUMENTARY/SKIN: NEGATIVE for worrisome rashes, moles or lesions  EYES: She is legally blind and has a \"seeing eye\" dog  ENT: NEGATIVE for ear, mouth and throat problems  RESP: NEGATIVE for significant cough or SOB  BREAST: NEGATIVE for masses, tenderness or discharge  CV: NEGATIVE for chest pain, palpitations or peripheral edema  GI: NEGATIVE for nausea, abdominal pain, heartburn, or change in bowel habits  : NEGATIVE for unusual urinary or vaginal symptoms. No vaginal bleeding.  MUSCULOSKELETAL: NEGATIVE for significant arthralgias or myalgia  NEURO: NEGATIVE for weakness, dizziness or paresthesias  PSYCHIATRIC: NEGATIVE for changes in mood or affect     OBJECTIVE:   /83 (BP Location: Right arm, Patient " "Position: Sitting, Cuff Size: Adult Regular)   Pulse 83   Temp 97.6  F (36.4  C) (Oral)   Ht 1.626 m (5' 4\")   Wt 104.8 kg (231 lb)   LMP 06/22/2014   SpO2 95%   BMI 39.65 kg/m    EXAM:  GENERAL: alert, no distress and over weight  EYES: Vision impaired with legal blindness  HENT: ear canals and TM's normal, nose and mouth without ulcers or lesions  NECK: no adenopathy, no asymmetry, masses, or scars and thyroid normal to palpation  RESP: lungs clear to auscultation - no rales, rhonchi or wheezes  CV: regular rate and rhythm, normal S1 S2, no S3 or S4, no murmur, click or rub, no peripheral edema and peripheral pulses strong  ABDOMEN: soft, nontender, no hepatosplenomegaly, no masses   MS: no gross musculoskeletal defects noted, no edema  SKIN: no suspicious lesions or rashes  NEURO: Normal strength and tone, mentation intact and speech normal  PSYCH: mentation appears normal, affect normal/bright    Diagnostic Test Results:  Labs reviewed in Epic    ASSESSMENT/PLAN:       ICD-10-CM    1. Routine general medical examination at a health care facility Z00.00    2. Blindness of both eyes H54.3    3. Obesity (BMI 35.0-39.9 without comorbidity) E66.9    4. Thyroid nodules E04.1    5. S/P partial thyroidectomy Z98.890    6. Screen for colon cancer Z12.11 GASTROENTEROLOGY ADULT REF PROCEDURE ONLY Other; [facility] [phone]     Blood pressure and other vital signs look reasonable  She had some normal screening fasting lab work a year ago and a normal TSH in July, so we elected to pass on lab work today  She was encouraged on diet and exercise treatment for weight loss  We discussed guidelines and options for colon cancer screening and she was agreeable to a colonoscopy, so she was referred for that  She had a mammogram a year ago and will likely wait another year before doing the next one because she does not have a family history of breast cancer or other significant risk for that  She has had a hysterectomy in the " "past for benign disease, so no Pap smear necessary  Plan a recheck in 1 year    COUNSELING:   Reviewed preventive health counseling, as reflected in patient instructions       Regular exercise       Healthy diet/nutrition    Estimated body mass index is 39.65 kg/m  as calculated from the following:    Height as of this encounter: 1.626 m (5' 4\").    Weight as of this encounter: 104.8 kg (231 lb).    Weight management plan: Discussed healthy diet and exercise guidelines     reports that she has never smoked. She has never used smokeless tobacco.      Counseling Resources:  ATP IV Guidelines  Pooled Cohorts Equation Calculator  Breast Cancer Risk Calculator  FRAX Risk Assessment  ICSI Preventive Guidelines  Dietary Guidelines for Americans, 2010  USDA's MyPlate  ASA Prophylaxis  Lung CA Screening    Aneesh Castelan MD  [facility]  "

## 2020-02-03 ENCOUNTER — MYC MEDICAL ADVICE (OUTPATIENT)
Dept: FAMILY MEDICINE | Facility: CLINIC | Age: 50
End: 2020-02-03

## 2020-02-03 NOTE — TELEPHONE ENCOUNTER
Routed patient's MyChart request to Dr. Castelan to advise.  Patient was last seen 12/18/19, 1 year follow up was advised.    Rose Decker RN  [facility]

## 2020-02-07 ENCOUNTER — TRANSFERRED RECORDS (OUTPATIENT)
Dept: HEALTH INFORMATION MANAGEMENT | Facility: CLINIC | Age: 50
End: 2020-02-07

## 2020-02-07 NOTE — TELEPHONE ENCOUNTER
Patient dropped off [facility] [place] Mobility forms for Dr. Castelan to complete.  Please call patient when they are ready for  at the .  Thank you.    Dolores CHILD.  Patient Representative  [place]

## 2020-02-13 PROBLEM — Z86.0100 HISTORY OF COLONIC POLYPS: Status: ACTIVE | Noted: 2020-02-13

## 2020-11-14 ENCOUNTER — HEALTH MAINTENANCE LETTER (OUTPATIENT)
Age: 50
End: 2020-11-14

## 2021-02-06 ENCOUNTER — HEALTH MAINTENANCE LETTER (OUTPATIENT)
Age: 51
End: 2021-02-06

## 2021-03-12 ENCOUNTER — IMMUNIZATION (OUTPATIENT)
Dept: NURSING | Facility: CLINIC | Age: 51
End: 2021-03-12
Payer: COMMERCIAL

## 2021-03-12 PROCEDURE — 0001A PR COVID VAC PFIZER DIL RECON 30 MCG/0.3 ML IM: CPT

## 2021-03-12 PROCEDURE — 91300 PR COVID VAC PFIZER DIL RECON 30 MCG/0.3 ML IM: CPT

## 2021-04-02 ENCOUNTER — IMMUNIZATION (OUTPATIENT)
Dept: NURSING | Facility: CLINIC | Age: 51
End: 2021-04-02
Attending: INTERNAL MEDICINE
Payer: COMMERCIAL

## 2021-04-02 PROCEDURE — 0002A PR COVID VAC PFIZER DIL RECON 30 MCG/0.3 ML IM: CPT

## 2021-04-02 PROCEDURE — 91300 PR COVID VAC PFIZER DIL RECON 30 MCG/0.3 ML IM: CPT

## 2021-04-19 ENCOUNTER — OFFICE VISIT (OUTPATIENT)
Dept: FAMILY MEDICINE | Facility: CLINIC | Age: 51
End: 2021-04-19
Payer: COMMERCIAL

## 2021-04-19 ENCOUNTER — ANCILLARY PROCEDURE (OUTPATIENT)
Dept: MAMMOGRAPHY | Facility: CLINIC | Age: 51
End: 2021-04-19
Attending: FAMILY MEDICINE
Payer: COMMERCIAL

## 2021-04-19 VITALS
HEART RATE: 73 BPM | HEIGHT: 64 IN | WEIGHT: 237.8 LBS | OXYGEN SATURATION: 98 % | SYSTOLIC BLOOD PRESSURE: 132 MMHG | BODY MASS INDEX: 40.6 KG/M2 | TEMPERATURE: 97.7 F | DIASTOLIC BLOOD PRESSURE: 85 MMHG

## 2021-04-19 DIAGNOSIS — E89.0 S/P PARTIAL THYROIDECTOMY: ICD-10-CM

## 2021-04-19 DIAGNOSIS — E66.01 MORBID OBESITY (H): ICD-10-CM

## 2021-04-19 DIAGNOSIS — Z11.59 NEED FOR HEPATITIS C SCREENING TEST: ICD-10-CM

## 2021-04-19 DIAGNOSIS — Z12.31 VISIT FOR SCREENING MAMMOGRAM: ICD-10-CM

## 2021-04-19 DIAGNOSIS — Z00.00 ROUTINE GENERAL MEDICAL EXAMINATION AT A HEALTH CARE FACILITY: Primary | ICD-10-CM

## 2021-04-19 DIAGNOSIS — H54.3 BLINDNESS OF BOTH EYES: ICD-10-CM

## 2021-04-19 LAB
CHOLEST SERPL-MCNC: 179 MG/DL
ERYTHROCYTE [DISTWIDTH] IN BLOOD BY AUTOMATED COUNT: 13.8 % (ref 10–15)
GLUCOSE SERPL-MCNC: 69 MG/DL (ref 70–99)
HCT VFR BLD AUTO: 44.7 % (ref 35–47)
HCV AB SERPL QL IA: NONREACTIVE
HDLC SERPL-MCNC: 76 MG/DL
HGB BLD-MCNC: 14.9 G/DL (ref 11.7–15.7)
LDLC SERPL CALC-MCNC: 94 MG/DL
MCH RBC QN AUTO: 27.7 PG (ref 26.5–33)
MCHC RBC AUTO-ENTMCNC: 33.3 G/DL (ref 31.5–36.5)
MCV RBC AUTO: 83 FL (ref 78–100)
NONHDLC SERPL-MCNC: 103 MG/DL
PLATELET # BLD AUTO: 228 10E9/L (ref 150–450)
RBC # BLD AUTO: 5.37 10E12/L (ref 3.8–5.2)
TRIGL SERPL-MCNC: 44 MG/DL
TSH SERPL DL<=0.005 MIU/L-ACNC: 3.03 MU/L (ref 0.4–4)
WBC # BLD AUTO: 6.2 10E9/L (ref 4–11)

## 2021-04-19 PROCEDURE — 82947 ASSAY GLUCOSE BLOOD QUANT: CPT | Performed by: FAMILY MEDICINE

## 2021-04-19 PROCEDURE — 99396 PREV VISIT EST AGE 40-64: CPT | Performed by: FAMILY MEDICINE

## 2021-04-19 PROCEDURE — 77067 SCR MAMMO BI INCL CAD: CPT | Mod: TC | Performed by: RADIOLOGY

## 2021-04-19 PROCEDURE — 86803 HEPATITIS C AB TEST: CPT | Performed by: FAMILY MEDICINE

## 2021-04-19 PROCEDURE — 84443 ASSAY THYROID STIM HORMONE: CPT | Performed by: FAMILY MEDICINE

## 2021-04-19 PROCEDURE — 36415 COLL VENOUS BLD VENIPUNCTURE: CPT | Performed by: FAMILY MEDICINE

## 2021-04-19 PROCEDURE — 80061 LIPID PANEL: CPT | Performed by: FAMILY MEDICINE

## 2021-04-19 PROCEDURE — 85027 COMPLETE CBC AUTOMATED: CPT | Performed by: FAMILY MEDICINE

## 2021-04-19 ASSESSMENT — MIFFLIN-ST. JEOR: SCORE: 1679.52

## 2021-04-19 NOTE — PROGRESS NOTES
SUBJECTIVE:   CC: Tresa Orellana is an 51 year old woman who presents for a preventive health visit and to complete paperwork for her.       Patient has been advised of split billing requirements and indicates understanding: Yes  Healthy Habits:    Do you get at least three servings of calcium containing foods daily (dairy, green leafy vegetables, etc.)? yes    Amount of exercise or daily activities, outside of work: 3-4 day(s) per week    Problems taking medications regularly not applicable    Medication side effects: No    Have you had an eye exam in the past two years? no    Do you see a dentist twice per year? no    Do you have sleep apnea, excessive snoring or daytime drowsiness?no    Other concerns:   Forms for a new guide dog.    She is legally blind.  She has a jaylan dog that is 9-1/2 years old.  She anticipates going through the 2-week course to get a new guide dog.  There is about a 2-year wait currently for that process.  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    She is generally feeling well.  Her  had a severe Covid infection last fall, but is recovering.  She has been fully vaccinated against Covid.  She is due for a mammogram, but has that scheduled for later this morning.  She did come in fasting today for lab work.  She is on no routine medications.    Today's PHQ-2 Score:   PHQ-2 ( 1999 Pfizer) 4/19/2021 12/18/2019   Q1: Little interest or pleasure in doing things 0 0   Q2: Feeling down, depressed or hopeless 0 0   PHQ-2 Score 0 0   Q1: Little interest or pleasure in doing things - -   Q2: Feeling down, depressed or hopeless - -   PHQ-2 Score - -       Abuse: Current or Past(Physical, Sexual or Emotional)- No  Do you feel safe in your environment? Yes    Have you ever done Advance Care Planning? (For example, a Health Directive, POLST, or a discussion with a medical provider or your loved ones about your wishes): No, advance care  planning information given to patient to review.  Advanced care planning was discussed at today's visit.    Social History     Tobacco Use     Smoking status: Never Smoker     Smokeless tobacco: Never Used   Substance Use Topics     Alcohol use: Yes     Comment: occassional     If you drink alcohol do you typically have >3 drinks per day or >7 drinks per week? No                     Reviewed orders with patient.  Reviewed health maintenance and updated orders accordingly - Yes  Patient Active Problem List   Diagnosis     Eczema     Chronic rhinitis     Seasonal allergies     Family history of ischemic heart disease     Legal blindness, ou     Thyroid nodules     Obesity (BMI 35.0-39.9 without comorbidity)     S/P partial thyroidectomy     History of colonic polyps     Morbid obesity (H)     Past Surgical History:   Procedure Laterality Date     bilat knee surgery torn ligaments  age 15     C CORNEAL TRANSPLANT,LAMELLAR  18 mos    rejected     HYSTERECTOMY, CANDIE  7/29/2014    CANDIE/BS  ovaries conserved.     partial thryoidectomy  06/2019    by Dr. Pimentel for thyroid nodules       Social History     Tobacco Use     Smoking status: Never Smoker     Smokeless tobacco: Never Used   Substance Use Topics     Alcohol use: Yes     Comment: occassional     Family History   Problem Relation Age of Onset     Cardiovascular Mother 63        mi         Current Outpatient Medications   Medication Sig Dispense Refill     Acetaminophen (TYLENOL PO)        Allergies   Allergen Reactions     Amoxicillin      Codeine      Penicillins          Pertinent mammograms are reviewed under the imaging tab.  History of abnormal Pap smear: Status post benign hysterectomy. Health Maintenance and Surgical History updated.  PAP / HPV 6/27/2014 6/2/2011   PAP NIL NIL     Reviewed and updated as needed this visit by clinical staff  Tobacco  Allergies  Meds   Med Hx  Surg Hx  Fam Hx  Soc Hx        Reviewed and updated as needed this visit by  "Provider                    ROS:   ROS: 10 point ROS neg other than the symptoms noted above in the HPI.      OBJECTIVE:   LMP 06/22/2014    /85 (BP Location: Right arm, Patient Position: Sitting, Cuff Size: Adult Large)   Pulse 73   Temp 97.7  F (36.5  C) (Oral)   Ht 1.635 m (5' 4.37\")   Wt 107.9 kg (237 lb 12.8 oz)   LMP 06/22/2014   SpO2 98%   BMI 40.35 kg/m      EXAM:  GENERAL: alert, no distress and obese  EYES: She is legally blind and uses a guide dog  HENT: Grossly normal  NECK: no adenopathy, no asymmetry, masses, or scars and thyroid normal to palpation  RESP: lungs clear to auscultation - no rales, rhonchi or wheezes  CV: regular rate and rhythm, normal S1 S2, no S3 or S4, no murmur, click or rub, no peripheral edema and peripheral pulses strong  ABDOMEN: soft, nontender, no hepatosplenomegaly, no masses   MS: no gross musculoskeletal defects noted, no edema  SKIN: no suspicious lesions or rashes  NEURO: Normal strength and tone, mentation intact and speech normal  PSYCH: mentation appears normal, affect normal/bright    Diagnostic Test Results:  Labs reviewed in Epic    ASSESSMENT/PLAN:       ICD-10-CM    1. Routine general medical examination at a health care facility  Z00.00 Glucose     Lipid panel reflex to direct LDL Fasting     TSH with free T4 reflex     CBC with platelets   2. Morbid obesity (H)  E66.01    3. Blindness of both eyes  H54.3    4. Need for hepatitis C screening test  Z11.59 Hepatitis C Screen Reflex to HCV RNA Quant and Genotype   5. S/P partial thyroidectomy  Z98.890      Blood pressure and other vital signs look acceptable  We discussed the above items  We will check fasting lab work today as above  I completed her 2 pages of paperwork in anticipation of her getting a new guide dog  She was encouraged on diet and exercise treatment for weight loss  Plan a tentative recheck in 1 year, or sooner prn    Patient has been advised of split billing requirements and indicates " "understanding: Yes  COUNSELING:   Reviewed preventive health counseling, as reflected in patient instructions       Regular exercise       Healthy diet/nutrition    Estimated body mass index is 40.35 kg/m  as calculated from the following:    Height as of this encounter: 1.635 m (5' 4.37\").    Weight as of this encounter: 107.9 kg (237 lb 12.8 oz).    Weight management plan: Discussed healthy diet and exercise guidelines    She reports that she has never smoked. She has never used smokeless tobacco.      Counseling Resources:  ATP IV Guidelines  Pooled Cohorts Equation Calculator  Breast Cancer Risk Calculator  BRCA-Related Cancer Risk Assessment: FHS-7 Tool  FRAX Risk Assessment  ICSI Preventive Guidelines  Dietary Guidelines for Americans, 2010  USDA's MyPlate  ASA Prophylaxis  Lung CA Screening    Aneesh Castelan MD  [facility]  "

## 2021-04-20 NOTE — RESULT ENCOUNTER NOTE
Tresa,  Your lab work continues to look nice and healthy and is basically all normal including blood counts, lipid values, diabetes testing, thyroid test, and hepatitis C screening antibody test.    Aneesh Castelan MD

## 2021-09-12 ENCOUNTER — HEALTH MAINTENANCE LETTER (OUTPATIENT)
Age: 51
End: 2021-09-12

## 2022-06-19 ENCOUNTER — HEALTH MAINTENANCE LETTER (OUTPATIENT)
Age: 52
End: 2022-06-19

## 2022-10-06 ENCOUNTER — NURSE TRIAGE (OUTPATIENT)
Dept: NURSING | Facility: CLINIC | Age: 52
End: 2022-10-06

## 2022-10-06 ENCOUNTER — TELEPHONE (OUTPATIENT)
Dept: FAMILY MEDICINE | Facility: CLINIC | Age: 52
End: 2022-10-06

## 2022-10-06 ENCOUNTER — VIRTUAL VISIT (OUTPATIENT)
Dept: FAMILY MEDICINE | Facility: CLINIC | Age: 52
End: 2022-10-06
Payer: COMMERCIAL

## 2022-10-06 DIAGNOSIS — U07.1 COVID-19 VIRUS INFECTION: Primary | ICD-10-CM

## 2022-10-06 DIAGNOSIS — E66.01 MORBID OBESITY (H): ICD-10-CM

## 2022-10-06 PROCEDURE — 99213 OFFICE O/P EST LOW 20 MIN: CPT | Mod: CS | Performed by: PHYSICIAN ASSISTANT

## 2022-10-06 NOTE — TELEPHONE ENCOUNTER
"Symptoms started 10/3/22. Home Covid test positive 10/4/22. Requesting treatment. Scheduling information provided.     Routing to ambulatory infection prevention    Instructions for Patients  Your COVID-19 test was positive. This means you have the virus. Please follow the \"How can I take care of myself\" and isolation guidelines in these instructions.    What treatments are available?  Over-the-counter medicines may help with your symptoms such as runny or stuffy nose, cough, chills, and fever. Talk to your care team about your options.     Some people are at high risk for severe illness (for example if you have a weak immune system, you're 65 or older, or you have certain medical problems). If your risk is high and your symptoms started in the last 5 to 7 days, we strongly recommend for you to get COVID treatment as soon as possible. Paxlovid, Molnupiravir and the monoclonal antibody treatments are proven safe and effective, make you feel better faster, and prevent hospitalization and death.       These guidelines are for isolating and quarantining before returning to work, school or .     For employers, schools and day cares: This is an official notice for this person s medical guidelines for returning in-person.     For health care sites: The CDC gives different isolation and quarantine guidelines for healthcare sites, please check with these sites before arriving.     How do I self-isolate?  You isolate when you have symptoms of COVID or a test shows you have COVID, even if you don t have symptoms.     If you DO have symptoms:  o Stay home and away from others  - For at least 5 days after your symptoms started, AND   - You are fever free for 24 hours (with no medicine that reduces fever), AND  - Your other symptoms are better.  o Wear a mask for 10 full days any time you are around others.    If you DON T have symptoms:  o Stay at home and away from others for at least 5 days after your positive " "test.  o Wear a mask for 10 full days any time you are around others.    You can schedule an appointment to discuss COVID treatment by requesting an appointment on [facility] by selecting \"schedule COVID-19 Treatment\" or by calling [facility] ([phone]).    What are the symptoms of COVID-19?  Symptoms can include fever, cough, shortness of breath, chills, headache, muscle pain sore throat, fatigue, runny or stuffy nose, and loss of taste and smell. Other less common symptoms include nausea, vomiting, or diarrhea (watery stools).    Know when to call 911. Emergency warning signs include:    Trouble breathing or shortness of breath    Pain or pressure in the chest that doesn't go away    Feeling confused like you haven't felt before, or not being able to wake up    Bluish-colored lips or face    How can I take care of myself?  1. Get lots of rest. Drink extra fluids (unless a doctor has told you not to).  2. Take Tylenol (acetaminophen) for fever or pain. If you have liver or kidney problems, ask your family doctor if it's okay to take Tylenol   Adults:   650 mg (two 325 mg pills or tablets) every 4 to 6 hours, or...   1,000 mg (two 500 mg pills or tablets) every 8 hours as needed.  Note: Don't take more than 3,000 mg in one day. Acetaminophen is found in many medicines (both prescribed and over the counter). Read all labels to be sure you don't take too much.  For children, check the Tylenol bottle for the right dose. The dose is based on the child's age or weight.  3. Take over the counter medicines for your symptoms as needed. Talk to your pharmacist.  4. If you have other health problems (like cancer, heart failure, an organ transplant, or severe kidney disease): Call your specialty clinic if you don't feel better in the next 2 days.    These guidelines are for isolating and quarantining before returning to work, school or .     For employers, schools and day cares: This is an official notice for this " person's medical guidelines for returning in-person.     For health care sites: The CDC gives different isolation and quarantine guidelines for healthcare sites, please check with these sites before arriving.     How do I self-isolate?  You isolate when you have symptoms of COVID or a test shows you have COVID, even if you don't have symptoms.     If you DO have symptoms:  o Stay home and away from others  - For at least 5 days after your symptoms started, AND   - You are fever free for 24 hours (with no medicine that reduces fever), AND  - Your other symptoms are better.  o Wear a mask for 10 full days any time you are around others.    If you DON'T have symptoms:  o Stay at home and away from others for at least 5 days after your positive test.  o Wear a mask for 10 full days any time you are around others.    How and when do I quarantine?  You quarantine when you may have been exposed to the virus and DON'T have symptoms.     Stay home and away from others.     You must quarantine for 5 days after your last contact with a person who has COVID.  o Note: If you are fully vaccinated, you don't need to quarantine. You should still follow the steps below.     Wear a mask for 10 full days any time you're around others.    Get tested at least 5 days after you were exposed, even if you don't have symptoms.     If you start to have symptoms, isolate right away and get tested.    Where can I get more information?    [facility] COVID-19 Resource Hub: [email or web address]     CDC Quarantine & Isolation: https://www.cdc.gov/coronavirus/2019-ncov/your-health/quarantine-isolation.html     CDC - What to Do If You're Sick: https://www.cdc.gov/coronavirus/2019-ncov/if-you-are-sick/index.html    [facility] clinical trials (COVID-19 research studies): clinicalaffairs.[facility]/umn-clinical-trials    [place] Department of Health COVID-19 Public Hotline: 1-971.181.1932

## 2022-10-06 NOTE — PROGRESS NOTES
Tresa is a 52 year old who is being evaluated via a billable telephone visit.      What phone number would you like to be contacted at? cell  How would you like to obtain your AVS? [facility]    Assessment & Plan   Problem List Items Addressed This Visit        Digestive    Morbid obesity (H)      Other Visit Diagnoses     COVID-19 virus infection    -  Primary    Relevant Medications    nirmatrelvir and ritonavir (PAXLOVID) therapy pack         Impression is COVID-19. Positive home test. Fully vaccinated and boosted x 1. Sounds well and non-toxic and I have low suspicion for impending airway obstruction or respiratory distress.  She will push p.o. fluids, use over-the-counter meds for symptoms, complete a course of Paxlovid after discussing risks/benefits, and follow-up with us in 2 weeks if not improving or urgent care/the ER if symptoms worsen/change at any time.    DDx and Dx discussed with and explained to the pt to their satisfaction.  All questions were answered at this time. Pt expressed understanding of and agreement with this dx, tx, and plan. No further workup warranted and standard medication warnings given. I have given the patient a list of pertinent indications for re-evaluation. Will go to the Emergency Department if symptoms worsen or new concerning symptoms arise. Patient left the call in no apparent distress.     Prescription drug management  15 minutes spent on the date of the encounter doing chart review, history and exam, documentation and further activities per the note     See Patient Instructions  COVID-19 positive patient.  Encounter for consideration of medication intervention. Patient does qualify for a prescription. Full discussion with patient including medication options, risks and benefits. Potential drug interactions reviewed with patient.     Treatment Planned Paxlovid  Temporary change to home medications:  None     Estimated body mass index is 40.35 kg/m  as calculated from the  "following:    Height as of 4/19/21: 1.635 m (5' 4.37\").    Weight as of 4/19/21: 107.9 kg (237 lb 12.8 oz).  No results found for: GFRESTIMATED  No results found for: FUMIS30RRD      Return in about 2 weeks (around 10/20/2022) for a recheck of your symptoms if not improving, or call 911/go to an ER anytime if worsening.    ALBAN Wilburn  [facility] NELSON Gtz is a 52 year old presenting for the following health issues:  positive covid      HPI       COVID-19 Symptom Review  How many days ago did these symptoms start? Monday 10/3/22, positive test the next day at home.    Are any of the following symptoms significant for you?    New or worsening difficulty breathing? No    Worsening cough? Yes, it's a dry cough.     Fever or chills? No    Headache: YES    Sore throat: YES    Chest pain: No    Diarrhea: No    Body aches? No    What treatments has patient tried? Advil, Robitussin DM and throat spray   Does patient live in a nursing home, group home, or shelter? No  Does patient have a way to get food/medications during quarantined? Yes, I have a friend or family member who can help me.Had COVID in 2020.    Review of Systems   Constitutional, HEENT, cardiovascular, pulmonary, gi and gu systems are negative, except as otherwise noted.      Objective           Vitals:  No vitals were obtained today due to virtual visit.    Physical Exam   healthy, alert and no distress  PSYCH: Alert and oriented times 3; coherent speech, normal   rate and volume, able to articulate logical thoughts, able   to abstract reason, no tangential thoughts, no hallucinations   or delusions  Her affect is normal and pleasant  RESP: No cough, no audible wheezing, able to talk in full sentences  Remainder of exam unable to be completed due to telephone visits        Phone call duration: 9 minutes    "

## 2022-10-06 NOTE — TELEPHONE ENCOUNTER
Pt is phoning stating that she tested positive for COVID on Tuesday 10/04/2022    Pt is calling to get an appointment for Paxlovid - transferred to scheduling     Declined triage     Care advice given per protocol and when to call back. Pt verbalized understanding and agrees to plan of care.    Samara Bolton RN  [facility] Nurse Advisor  11:48 AM 10/6/2022      COVID 19 Nurse Triage Plan/Patient Instructions    Please be aware that novel coronavirus (COVID-19) may be circulating in the community. If you develop symptoms such as fever, cough, or SOB or if you have concerns about the presence of another infection including coronavirus (COVID-19), please contact your health care provider or visit [email or web address].     Disposition/Instructions    Virtual Visit with provider recommended. Reference Visit Selection Guide.    Thank you for taking steps to prevent the spread of this virus.  o Limit your contact with others.  o Wear a simple mask to cover your cough.  o Wash your hands well and often.    Resources    [facility]: About COVID-19: [email or web address]    CDC: What to Do If You're Sick: www.cdc.gov/coronavirus/2019-ncov/about/steps-when-sick.html    CDC: Ending Home Isolation: www.cdc.gov/coronavirus/2019-ncov/hcp/disposition-in-home-patients.html     CDC: Caring for Someone: www.cdc.gov/coronavirus/2019-ncov/if-you-are-sick/care-for-someone.html     [facility]: Interim Guidance for Hospital Discharge to Home: www.health.[facility].[place]/diseases/coronavirus/hcp/hospdischarge.pdf    [facility] clinical trials (COVID-19 research studies): clinicalaffairs.[facility]-clinical-trials     Below are the COVID-19 hotlines at the [place] Department of Health ([facility]). Interpreters are available.   o For health questions: Call 064-206-2344 or 1-819.914.3159 (7 a.m. to 7 p.m.)  o For questions about schools and childcare: Call 881-217-3908 or 1-578.238.7378 (7 a.m. to 7 p.m.)                    Reason for Disposition    General information question, no triage required and triager able to answer question    Additional Information    Negative: [1] Caller is not with the adult (patient) AND [2] reporting urgent symptoms    Negative: Lab result questions    Negative: Medication questions    Negative: Caller can't be reached by phone    Negative: Caller has already spoken to PCP or another triager    Negative: RN needs further essential information from caller in order to complete triage    Negative: Requesting regular office appointment    Negative: [1] Caller requesting NON-URGENT health information AND [2] PCP's office is the best resource    Negative: Health Information question, no triage required and triager able to answer question    Protocols used: INFORMATION ONLY CALL - NO TRIAGE-A-

## 2022-10-06 NOTE — PATIENT INSTRUCTIONS
Luna Gtz,    Thank you for allowing [facility] to manage your care.    If you develop worsening/changing symptoms at any time, please call 911 or go to the emergency department for evaluation.    I sent your prescriptions to your pharmacy.    Drink 8-10 glasses of fluid daily to stay well-hydrated.    For your pain, please use Tylenol 650mg every 6 hours. You may use 400mg of ibuprofen between doses of Tylenol.     Max acetaminophen (Tylenol) 3,000mg/24 hours  Max ibuprofen 2,000mg/24 hours    If you have any questions or concerns, please feel free to call us at [phone]    Sincerely,    Jerry Valdivia PA-C    Did you know?      You can schedule a video visit for follow-up appointments as well as future appointments for certain conditions.  Please see the below link.     [email or web address]    If you have not already done so,  I encourage you to sign up for [email or web address] ([email or web address]).  This will allow you to review your results, securely communicate with a provider, and schedule virtual visits as well.

## 2022-11-19 ENCOUNTER — HEALTH MAINTENANCE LETTER (OUTPATIENT)
Age: 52
End: 2022-11-19

## 2023-07-01 ENCOUNTER — HEALTH MAINTENANCE LETTER (OUTPATIENT)
Age: 53
End: 2023-07-01

## 2024-02-20 ENCOUNTER — OFFICE VISIT (OUTPATIENT)
Dept: INTERNAL MEDICINE | Facility: CLINIC | Age: 54
End: 2024-02-20
Payer: COMMERCIAL

## 2024-02-20 ENCOUNTER — ANCILLARY PROCEDURE (OUTPATIENT)
Dept: MAMMOGRAPHY | Facility: CLINIC | Age: 54
End: 2024-02-20
Attending: FAMILY MEDICINE
Payer: COMMERCIAL

## 2024-02-20 VITALS
OXYGEN SATURATION: 99 % | HEIGHT: 64 IN | SYSTOLIC BLOOD PRESSURE: 130 MMHG | DIASTOLIC BLOOD PRESSURE: 85 MMHG | BODY MASS INDEX: 40.46 KG/M2 | RESPIRATION RATE: 18 BRPM | TEMPERATURE: 98.5 F | WEIGHT: 237 LBS | HEART RATE: 69 BPM

## 2024-02-20 DIAGNOSIS — L30.9 ECZEMA, UNSPECIFIED TYPE: ICD-10-CM

## 2024-02-20 DIAGNOSIS — Z12.31 VISIT FOR SCREENING MAMMOGRAM: ICD-10-CM

## 2024-02-20 DIAGNOSIS — Z13.220 SCREENING FOR HYPERLIPIDEMIA: ICD-10-CM

## 2024-02-20 DIAGNOSIS — E55.9 VITAMIN D DEFICIENCY: ICD-10-CM

## 2024-02-20 DIAGNOSIS — Z13.29 SCREENING FOR THYROID DISORDER: ICD-10-CM

## 2024-02-20 DIAGNOSIS — Z13.1 SCREENING FOR DIABETES MELLITUS: ICD-10-CM

## 2024-02-20 DIAGNOSIS — Z00.00 ROUTINE GENERAL MEDICAL EXAMINATION AT A HEALTH CARE FACILITY: Primary | ICD-10-CM

## 2024-02-20 DIAGNOSIS — H61.23 BILATERAL IMPACTED CERUMEN: ICD-10-CM

## 2024-02-20 LAB
ALBUMIN SERPL BCG-MCNC: 4.4 G/DL (ref 3.5–5.2)
ALP SERPL-CCNC: 69 U/L (ref 40–150)
ALT SERPL W P-5'-P-CCNC: 16 U/L (ref 0–50)
ANION GAP SERPL CALCULATED.3IONS-SCNC: 10 MMOL/L (ref 7–15)
AST SERPL W P-5'-P-CCNC: 20 U/L (ref 0–45)
BASOPHILS # BLD AUTO: 0 10E3/UL (ref 0–0.2)
BASOPHILS NFR BLD AUTO: 0 %
BILIRUB SERPL-MCNC: 0.4 MG/DL
BUN SERPL-MCNC: 13.7 MG/DL (ref 6–20)
CALCIUM SERPL-MCNC: 9.5 MG/DL (ref 8.6–10)
CHLORIDE SERPL-SCNC: 106 MMOL/L (ref 98–107)
CHOLEST SERPL-MCNC: 195 MG/DL
CREAT SERPL-MCNC: 0.62 MG/DL (ref 0.51–0.95)
DEPRECATED HCO3 PLAS-SCNC: 25 MMOL/L (ref 22–29)
EGFRCR SERPLBLD CKD-EPI 2021: >90 ML/MIN/1.73M2
EOSINOPHIL # BLD AUTO: 0.2 10E3/UL (ref 0–0.7)
EOSINOPHIL NFR BLD AUTO: 3 %
ERYTHROCYTE [DISTWIDTH] IN BLOOD BY AUTOMATED COUNT: 13 % (ref 10–15)
FASTING STATUS PATIENT QL REPORTED: YES
GLUCOSE SERPL-MCNC: 82 MG/DL (ref 70–99)
HBA1C MFR BLD: 5.3 % (ref 0–5.6)
HCT VFR BLD AUTO: 44.7 % (ref 35–47)
HDLC SERPL-MCNC: 71 MG/DL
HGB BLD-MCNC: 14.3 G/DL (ref 11.7–15.7)
IMM GRANULOCYTES # BLD: 0 10E3/UL
IMM GRANULOCYTES NFR BLD: 0 %
LDLC SERPL CALC-MCNC: 112 MG/DL
LYMPHOCYTES # BLD AUTO: 1.5 10E3/UL (ref 0.8–5.3)
LYMPHOCYTES NFR BLD AUTO: 25 %
MCH RBC QN AUTO: 27.5 PG (ref 26.5–33)
MCHC RBC AUTO-ENTMCNC: 32 G/DL (ref 31.5–36.5)
MCV RBC AUTO: 86 FL (ref 78–100)
MONOCYTES # BLD AUTO: 0.3 10E3/UL (ref 0–1.3)
MONOCYTES NFR BLD AUTO: 4 %
NEUTROPHILS # BLD AUTO: 3.9 10E3/UL (ref 1.6–8.3)
NEUTROPHILS NFR BLD AUTO: 67 %
NONHDLC SERPL-MCNC: 124 MG/DL
PLATELET # BLD AUTO: 226 10E3/UL (ref 150–450)
POTASSIUM SERPL-SCNC: 4.5 MMOL/L (ref 3.4–5.3)
PROT SERPL-MCNC: 7.5 G/DL (ref 6.4–8.3)
RBC # BLD AUTO: 5.2 10E6/UL (ref 3.8–5.2)
SODIUM SERPL-SCNC: 141 MMOL/L (ref 135–145)
TRIGL SERPL-MCNC: 59 MG/DL
TSH SERPL DL<=0.005 MIU/L-ACNC: 3.27 UIU/ML (ref 0.3–4.2)
VIT D+METAB SERPL-MCNC: 76 NG/ML (ref 20–50)
WBC # BLD AUTO: 5.9 10E3/UL (ref 4–11)

## 2024-02-20 PROCEDURE — 99213 OFFICE O/P EST LOW 20 MIN: CPT | Mod: 25

## 2024-02-20 PROCEDURE — 85025 COMPLETE CBC W/AUTO DIFF WBC: CPT

## 2024-02-20 PROCEDURE — 82306 VITAMIN D 25 HYDROXY: CPT

## 2024-02-20 PROCEDURE — 80061 LIPID PANEL: CPT

## 2024-02-20 PROCEDURE — 84443 ASSAY THYROID STIM HORMONE: CPT

## 2024-02-20 PROCEDURE — 77067 SCR MAMMO BI INCL CAD: CPT | Mod: TC | Performed by: RADIOLOGY

## 2024-02-20 PROCEDURE — 99396 PREV VISIT EST AGE 40-64: CPT | Mod: 25

## 2024-02-20 PROCEDURE — 36415 COLL VENOUS BLD VENIPUNCTURE: CPT

## 2024-02-20 PROCEDURE — 69209 REMOVE IMPACTED EAR WAX UNI: CPT | Mod: 50

## 2024-02-20 PROCEDURE — 83036 HEMOGLOBIN GLYCOSYLATED A1C: CPT

## 2024-02-20 PROCEDURE — 80053 COMPREHEN METABOLIC PANEL: CPT

## 2024-02-20 RX ORDER — TRIAMCINOLONE ACETONIDE 1 MG/G
CREAM TOPICAL 2 TIMES DAILY
Qty: 15 G | Refills: 3 | Status: SHIPPED | OUTPATIENT
Start: 2024-02-20

## 2024-02-20 SDOH — HEALTH STABILITY: PHYSICAL HEALTH: ON AVERAGE, HOW MANY DAYS PER WEEK DO YOU ENGAGE IN MODERATE TO STRENUOUS EXERCISE (LIKE A BRISK WALK)?: 4 DAYS

## 2024-02-20 SDOH — HEALTH STABILITY: PHYSICAL HEALTH: ON AVERAGE, HOW MANY MINUTES DO YOU ENGAGE IN EXERCISE AT THIS LEVEL?: 40 MIN

## 2024-02-20 ASSESSMENT — SOCIAL DETERMINANTS OF HEALTH (SDOH): HOW OFTEN DO YOU GET TOGETHER WITH FRIENDS OR RELATIVES?: TWICE A WEEK

## 2024-02-20 NOTE — PATIENT INSTRUCTIONS
Preventive Care Advice   This is general advice given by our system to help you stay healthy. However, your care team may have specific advice just for you. Please talk to your care team about your preventive care needs.  Nutrition  Eat 5 or more servings of fruits and vegetables each day.  Try wheat bread, brown rice and whole grain pasta (instead of white bread, rice, and pasta).  Get enough calcium and vitamin D. Check the label on foods and aim for 100% of the RDA (recommended daily allowance).  Lifestyle  Exercise at least 150 minutes each week  (30 minutes a day, 5 days a week).  Do muscle strengthening activities 2 days a week. These help control your weight and prevent disease.  No smoking.  Wear sunscreen to prevent skin cancer.  Have a dental exam and cleaning every 6 months.  Yearly exams  See your health care team every year to talk about:  Any changes in your health.  Any medicines your care team has prescribed.  Preventive care, family planning, and ways to prevent chronic diseases.  Shots (vaccines)   HPV shots (up to age 26), if you've never had them before.  Hepatitis B shots (up to age 59), if you've never had them before.  COVID-19 shot: Get this shot when it's due.  Flu shot: Get a flu shot every year.  Tetanus shot: Get a tetanus shot every 10 years.  Pneumococcal, hepatitis A, and RSV shots: Ask your care team if you need these based on your risk.  Shingles shot (for age 50 and up)  General health tests  Diabetes screening:  Starting at age 35, Get screened for diabetes at least every 3 years.  If you are younger than age 35, ask your care team if you should be screened for diabetes.  Cholesterol test: At age 39, start having a cholesterol test every 5 years, or more often if advised.  Bone density scan (DEXA): At age 50, ask your care team if you should have this scan for osteoporosis (brittle bones).  Hepatitis C: Get tested at least once in your life.  STIs (sexually transmitted  infections)  Before age 24: Ask your care team if you should be screened for STIs.  After age 24: Get screened for STIs if you're at risk. You are at risk for STIs (including HIV) if:  You are sexually active with more than one person.  You don't use condoms every time.  You or a partner was diagnosed with a sexually transmitted infection.  If you are at risk for HIV, ask about PrEP medicine to prevent HIV.  Get tested for HIV at least once in your life, whether you are at risk for HIV or not.  Cancer screening tests  Cervical cancer screening: If you have a cervix, begin getting regular cervical cancer screening tests starting at age 21.  Breast cancer scan (mammogram): If you've ever had breasts, begin having regular mammograms starting at age 40. This is a scan to check for breast cancer.  Colon cancer screening: It is important to start screening for colon cancer at age 45.  Have a colonoscopy test every 10 years (or more often if you're at risk) Or, ask your provider about stool tests like a FIT test every year or Cologuard test every 3 years.  To learn more about your testing options, visit:   [email or web address].pdf.  For help making a decision, visit:   https://bit.ly/pd23912.  Prostate cancer screening test: If you have a prostate, ask your care team if a prostate cancer screening test (PSA) at age 55 is right for you.  Lung cancer screening: If you are a current or former smoker ages 50 to 80, ask your care team if ongoing lung cancer screenings are right for you.  For informational purposes only. Not to replace the advice of your health care provider. Copyright   2023 [employer]. All rights reserved. Clinically reviewed by the [facility] Transitions Program. [employer] [id number] - REV 01/24.

## 2024-02-20 NOTE — PROGRESS NOTES
Preventive Care Visit  [facility] DEBRA Cueva, SUZY CNP, Family Medicine  Feb 20, 2024  {Provider  Link to [facility] :393970}  {PROVIDER CHARTING PREFERENCE:578362}    Juaquin Gtz is a 53 year old, presenting for the following:  Physical and Forms ([employer])        2/20/2024     7:41 AM   Additional Questions   Roomed by Shanda COOPER        Via the Health Maintenance questionnaire, the patient has reported the following services have been completed -Mammogram, this information has been sent to the abstraction team.  Health Care Directive  Patient does not have a Health Care Directive or Living Will: {ADVANCE_DIRECTIVE_STATUS:209740}    HPI  ***  {MA/LPN/RN Pre-Provider Visit Orders- hCG/UA/Strep (Optional):206190}  {SUPERLIST (Optional):464592}  {additonal problems for provider to add (Optional):046679}      2/20/2024   General Health   How would you rate your overall physical health? Good   Feel stress (tense, anxious, or unable to sleep) Not at all         2/20/2024   Nutrition   Three or more servings of calcium each day? Yes   Diet: Regular (no restrictions)   How many servings of fruit and vegetables per day? 4 or more   How many sweetened beverages each day? 0-1         2/20/2024   Exercise   Days per week of moderate/strenous exercise 4 days   Average minutes spent exercising at this level 40 min         2/20/2024   Social Factors   Frequency of gathering with friends or relatives Twice a week   Worry food won't last until get money to buy more No   Food not last or not have enough money for food? No   Do you have housing?  Yes   Are you worried about losing your housing? No   Lack of transportation? No   Unable to get utilities (heat,electricity)? No         2/20/2024   Fall Risk   Fallen 2 or more times in the past year? No   Trouble with walking or balance? No          2/20/2024   Dental   Dentist two times every year? (!) NO         2/20/2024   TB Screening   Were  you born outside of [place]?  No         Today's PHQ-2 Score:       2/20/2024     7:48 AM   PHQ-2 ( 1999 [employer])   Q1: Little interest or pleasure in doing things 0   Q2: Feeling down, depressed or hopeless 0   PHQ-2 Score 0   Q1: Little interest or pleasure in doing things Not at all   Q2: Feeling down, depressed or hopeless Not at all   PHQ-2 Score 0           2/20/2024   Substance Use   Alcohol more than 3/day or more than 7/wk No   Do you use any other substances recreationally? No     Social History     Tobacco Use    Smoking status: Never    Smokeless tobacco: Never   Substance Use Topics    Alcohol use: Yes     Comment: occassional    Drug use: No     {If there are gaps in the social history shown above, please follow the link to update and then refresh the note Link to Social and Substance History :320900}        2/20/2024   Breast Cancer Screening   Family history of breast, colon, or ovarian cancer? No / Unknown          No data to display              {If any of the questions to the FHS7 are answered yes, consider referral for genetic counseling.    Additional indications for genetic referral include personal history of breast or ovarian cancer, genetic mutation in 1st degree relative which increases risk of breast cancer including BRCA1, BRCA2, FLAQUITA, PALB 2, TP53, CHEK2, PTEN, CDH1, STK11 (per ACS) and/or 1st degree relative with history of pancreatic or high-risk prostate cancer (per NCCN):596630}   {Mammogram Decision Support (Optional):939056}        2/20/2024   STI Screening   New sexual partner(s) since last STI/HIV test? No     History of abnormal Pap smear: { :356298}        6/27/2014    12:00 AM 6/2/2011    10:15 AM   PAP / HPV   PAP (Historical) NIL  NIL      The ASCVD Risk score (Asiya CORREA, et al., 2019) failed to calculate for the following reasons:    The systolic blood pressure is missing    {Link to Fracture Risk Assessment Tool (Optional):316026}    {Use the storyboard to review patient history,  "after sections have been marked as reviewed, refresh note to capture documentation:005315}   Reviewed and updated as needed this visit by Provider                    {HISTORY OPTIONS (Optional):993769}    {ROS Picklists (Optional):985956}     Objective    Exam  LMP 06/22/2014    Estimated body mass index is 40.35 kg/m  as calculated from the following:    Height as of 4/19/21: 1.635 m (5' 4.37\").    Weight as of 4/19/21: 107.9 kg (237 lb 12.8 oz).    Physical Exam  {Exam Choices (Optional):090054}      Signed Electronically by: SUZY Almanza CNP  {Email feedback regarding this note to [email or web address]   :434027}  "

## 2024-02-20 NOTE — PROGRESS NOTES
"  Assessment & Plan     (Z00.00) Routine general medical examination at a health care facility  (primary encounter diagnosis)  Comment: Patient seen in clinic today for preventative odell exam. Needed to have [employer] [facility] paper work completed this visit. Discussed need to be   Plan: REVIEW OF HEALTH MAINTENANCE PROTOCOL ORDERS,         CBC with platelets and differential, Results noted to be WNL        Comprehensive metabolic panel (BMP + Alb, Alk         Phos, ALT, AST, Total. Bili, TP)        Results noted to be WNL     (Z13.220) Screening for hyperlipidemia  Comment: Discussed need to screen   Plan: Lipid panel reflex to direct LDL Fasting        LDL slightly elevated at 112 all other results noted to be WNL.    (Z13.1) Screening for diabetes mellitus  Comment: Discussed screening   Plan: Hemoglobin A1c        Results 5.3 WNL     (E55.9) Vitamin D deficiency  Comment: Discussed screening  Plan: Vitamin D Deficiency        Results was high at 76     (Z13.29) Screening for thyroid disorder  Comment: Discussed screening  Plan: TSH with free T4 reflex        Results 3.27 WNL     (L30.9) Eczema, unspecified type  Comment: Bilateral ear eczema. Discussed using kenalog cream.   Plan: Continue triamcinolone (KENALOG) 0.1 % external cream        Prescription sent to pharmacy    (H61.23) Bilateral impacted cerumen  Comment: Noted Bilateral impacted cerumen. Irrigation performed. Left and right ear noted to be cleared of most of the cerumen. Discussed patient using Debrox over the counter medication to help with ear wax removal at home.   Plan: [place] REMOVAL IMPACTED CERUMEN IRRIGATION/LVG         UNILAT        Performed  Left and right ear noted to be cleared of most of the cerumen. Tympanic membrane noted to be intact with no signs of infection.       BMI  Estimated body mass index is 40.68 kg/m  as calculated from the following:    Height as of this encounter: 1.626 m (5' 4\").    Weight as of this encounter: 107.5 kg (237 " "lb).       Counseling  Appropriate preventive services were discussed with this patient, including applicable screening as appropriate for fall prevention, nutrition, physical activity, Tobacco-use cessation, weight loss and cognition.  Checklist reviewing preventive services available has been given to the patient.  Reviewed patient's diet, addressing concerns and/or questions.   The patient was instructed to see the dentist every 6 months.             Juaquin Gtz is a 53 year old, presenting for the following health issues:  Physical and Forms ([employer])      2/20/2024     7:41 AM   Additional Questions   Roomed by Shanda COOPER     Via the Health Maintenance questionnaire, the patient has reported the following services have been completed -Mammogram, this information has been sent to the abstraction team.  HPI     Review of Systems  Constitutional, HEENT, cardiovascular, pulmonary, gi and gu systems are negative, except as otherwise noted.      Objective    /85 (BP Location: Right arm, Patient Position: Sitting, Cuff Size: Adult Large)   Pulse 69   Temp 98.5  F (36.9  C) (Temporal)   Resp 18   Ht 1.626 m (5' 4\")   Wt 107.5 kg (237 lb)   LMP 06/22/2014   SpO2 99%   BMI 40.68 kg/m    Body mass index is 40.68 kg/m .  Physical Exam  Constitutional:       Appearance: Normal appearance.   HENT:      Right Ear: Tympanic membrane, ear canal and external ear normal. There is impacted cerumen.      Left Ear: Tympanic membrane, ear canal and external ear normal. There is impacted cerumen.      Nose: Nose normal. No congestion or rhinorrhea.      Mouth/Throat:      Mouth: Mucous membranes are moist.   Eyes:      General:         Right eye: No discharge.         Left eye: No discharge.      Comments: Patient is blind    Cardiovascular:      Rate and Rhythm: Normal rate and regular rhythm.      Pulses: Normal pulses.      Heart sounds: Normal heart sounds. No murmur heard.     No friction rub. "   Pulmonary:      Effort: Pulmonary effort is normal. No respiratory distress.      Breath sounds: Normal breath sounds. No stridor. No wheezing or rhonchi.   Abdominal:      General: Bowel sounds are normal. There is no distension.      Palpations: There is no mass.      Tenderness: There is no abdominal tenderness.      Hernia: No hernia is present.   Musculoskeletal:         General: No swelling, tenderness, deformity or signs of injury. Normal range of motion.   Skin:     General: Skin is warm.      Coloration: Skin is not jaundiced or pale.      Findings: No bruising or erythema.   Neurological:      General: No focal deficit present.      Mental Status: She is alert and oriented to person, place, and time.   Psychiatric:         Mood and Affect: Mood normal.         Behavior: Behavior normal.         Thought Content: Thought content normal.         Judgment: Judgment normal.            Results for orders placed or performed in visit on 02/20/24   TSH with free T4 reflex     Status: Normal   Result Value Ref Range    TSH 3.27 0.30 - 4.20 uIU/mL   Hemoglobin A1c     Status: Normal   Result Value Ref Range    Hemoglobin A1C 5.3 0.0 - 5.6 %   Lipid panel reflex to direct LDL Fasting     Status: Abnormal   Result Value Ref Range    Cholesterol 195 <200 mg/dL    Triglycerides 59 <150 mg/dL    Direct Measure HDL 71 >=50 mg/dL    LDL Cholesterol Calculated 112 (H) <=100 mg/dL    Non HDL Cholesterol 124 <130 mg/dL    Patient Fasting > 8hrs? Yes     Narrative    Cholesterol  Desirable:  <200 mg/dL    Triglycerides  Normal:  Less than 150 mg/dL  Borderline High:  150-199 mg/dL  High:  200-499 mg/dL  Very High:  Greater than or equal to 500 mg/dL    Direct Measure HDL  Female:  Greater than or equal to 50 mg/dL   Male:  Greater than or equal to 40 mg/dL    LDL Cholesterol  Desirable:  <100mg/dL  Above Desirable:  100-129 mg/dL   Borderline High:  130-159 mg/dL   High:  160-189 mg/dL   Very High:  >= 190 mg/dL    Non HDL  Cholesterol  Desirable:  130 mg/dL  Above Desirable:  130-159 mg/dL  Borderline High:  160-189 mg/dL  High:  190-219 mg/dL  Very High:  Greater than or equal to 220 mg/dL   Comprehensive metabolic panel (BMP + Alb, Alk Phos, ALT, AST, Total. Bili, TP)     Status: Normal   Result Value Ref Range    Sodium 141 135 - 145 mmol/L    Potassium 4.5 3.4 - 5.3 mmol/L    Carbon Dioxide (CO2) 25 22 - 29 mmol/L    Anion Gap 10 7 - 15 mmol/L    Urea Nitrogen 13.7 6.0 - 20.0 mg/dL    Creatinine 0.62 0.51 - 0.95 mg/dL    GFR Estimate >90 >60 mL/min/1.73m2    Calcium 9.5 8.6 - 10.0 mg/dL    Chloride 106 98 - 107 mmol/L    Glucose 82 70 - 99 mg/dL    Alkaline Phosphatase 69 40 - 150 U/L    AST 20 0 - 45 U/L    ALT 16 0 - 50 U/L    Protein Total 7.5 6.4 - 8.3 g/dL    Albumin 4.4 3.5 - 5.2 g/dL    Bilirubin Total 0.4 <=1.2 mg/dL   Vitamin D Deficiency     Status: Abnormal   Result Value Ref Range    Vitamin D, Total (25-Hydroxy) 76 (H) 20 - 50 ng/mL    Narrative    Season, race, dietary intake, and treatment affect the concentration of 25-hydroxy-Vitamin D. Values may decrease during winter months and increase during summer months.    Vitamin D determination is routinely performed by an immunoassay specific for 25 hydroxyvitamin D3.  If an individual is on vitamin D2(ergocalciferol) supplementation, please specify 25 OH vitamin D2 and D3 level determination by LCMSMS test VITD23.     CBC with platelets and differential     Status: None   Result Value Ref Range    WBC Count 5.9 4.0 - 11.0 10e3/uL    RBC Count 5.20 3.80 - 5.20 10e6/uL    Hemoglobin 14.3 11.7 - 15.7 g/dL    Hematocrit 44.7 35.0 - 47.0 %    MCV 86 78 - 100 fL    MCH 27.5 26.5 - 33.0 pg    MCHC 32.0 31.5 - 36.5 g/dL    RDW 13.0 10.0 - 15.0 %    Platelet Count 226 150 - 450 10e3/uL    % Neutrophils 67 %    % Lymphocytes 25 %    % Monocytes 4 %    % Eosinophils 3 %    % Basophils 0 %    % Immature Granulocytes 0 %    Absolute Neutrophils 3.9 1.6 - 8.3 10e3/uL    Absolute  Lymphocytes 1.5 0.8 - 5.3 10e3/uL    Absolute Monocytes 0.3 0.0 - 1.3 10e3/uL    Absolute Eosinophils 0.2 0.0 - 0.7 10e3/uL    Absolute Basophils 0.0 0.0 - 0.2 10e3/uL    Absolute Immature Granulocytes 0.0 <=0.4 10e3/uL   CBC with platelets and differential     Status: None    Narrative    The following orders were created for panel order CBC with platelets and differential.  Procedure                               Abnormality         Status                     ---------                               -----------         ------                     CBC with platelets and d...[[id number]]                      Final result                 Please view results for these tests on the individual orders.   Results for orders placed or performed in visit on 02/20/24   *MA Screening Digital Bilateral     Status: Normal    Narrative    BILATERAL FULL FIELD DIGITAL SCREENING MAMMOGRAM    Performed on: 2/20/24    Compared to: 04/19/2021, 12/03/2018, and 05/16/2011    Technique: This study was evaluated with the assistance of Computer-Aided   Detection.    Findings: The breasts have scattered areas of fibroglandular density.    There is no radiographic evidence of malignancy.     Impression    IMPRESSION: ACR BI-RADS Category 1: Negative    BREAST CANCER SCREENING RECOMMENDATION: Routine yearly mammography   beginning at age 40 or as discussed with your provider.    The results and recommendations of this examination will be communicated   to the patient.        Hossein Nixon MD             Signed Electronically by: SUZY Almanza CNP

## 2024-02-21 ENCOUNTER — PATIENT OUTREACH (OUTPATIENT)
Dept: GASTROENTEROLOGY | Facility: CLINIC | Age: 54
End: 2024-02-21
Payer: COMMERCIAL

## 2024-11-06 ENCOUNTER — PATIENT OUTREACH (OUTPATIENT)
Dept: GASTROENTEROLOGY | Facility: CLINIC | Age: 54
End: 2024-11-06
Payer: COMMERCIAL

## 2024-11-06 DIAGNOSIS — Z12.11 SPECIAL SCREENING FOR MALIGNANT NEOPLASMS, COLON: Primary | ICD-10-CM

## 2024-11-06 NOTE — PROGRESS NOTES
"Pt with hx of adenomatous polyps with 5yr recall recommended on last colonoscopy performed in 2020    [facility] Screening Colonoscopy Referral Review    Patient meets the inclusion criteria for screening colonoscopy standing order.    Ordering/Referring Provider:    Naye Cueva APRN CNP         BMI: Estimated body mass index is 40.68 kg/m  as calculated from the following:    Height as of 2/20/24: 1.626 m (5' 4\").    Weight as of 2/20/24: 107.5 kg (237 lb).     Sedation:  Does patient have any of the following conditions affecting sedation?  No medical conditions affecting sedation.    Previous Scopes:  Any previous recommendations or follow up needs based on previous scope?  na / No recommendations.    Medical Concerns to Postpone Order:  Does patient have any of the following medical concerns that should postpone/delay colonoscopy referral?  No medical conditions affecting colonoscopy referral.    Final Referral Details:  Based on patient's medical history patient is appropriate for referral order with moderate sedation. If patient's BMI > 50 do not schedule in ASC.  "

## 2025-01-23 ENCOUNTER — VIRTUAL VISIT (OUTPATIENT)
Dept: FAMILY MEDICINE | Facility: CLINIC | Age: 55
End: 2025-01-23
Payer: COMMERCIAL

## 2025-01-23 DIAGNOSIS — Z86.0100 HISTORY OF COLONIC POLYPS: ICD-10-CM

## 2025-01-23 DIAGNOSIS — J06.9 VIRAL URI WITH COUGH: Primary | ICD-10-CM

## 2025-01-23 ASSESSMENT — ENCOUNTER SYMPTOMS: COUGH: 1

## 2025-01-23 NOTE — PROGRESS NOTES
Tresa is a 54 year old who is being evaluated via a billable video visit.    How would you like to obtain your AVS? [employer]  If the video visit is dropped, the invitation should be resent by: Text to cell phone: [phone]  Will anyone else be joining your video visit? No      Assessment & Plan       ICD-10-CM    1. Viral URI with cough  J06.9       2. History of colonic polyps  Z86.0100 Colonoscopy Screening  Referral        She likely has a viral URI  We discussed symptomatic treatment including Mucinex during the day and possible use of a cough suppressant at night  We will follow this expectantly  I asked her to call or message us next week if she seems to be getting worse instead of better    We discussed her history of colon polyps and I will make a referral to have her undergo a 5-year follow-up colonoscopy    I suggested a routine annual physical in a month or so  She will probably wait to do another mammogram until next year    The longitudinal plan of care for the diagnosis(es)/condition(s) as documented were addressed during this visit. Due to the added complexity in care, I will continue to support Tresa in the subsequent management and with ongoing continuity of care.        Subjective   Tresa is a 54 year old, presenting for the following health issues:  Cough      1/23/2025     3:00 PM   Additional Questions   Roomed by Geetha   Accompanied by self     Cough    History of Present Illness       Reason for visit:  Just congestion and a cough  Symptom onset:  1-3 days ago  Symptoms include:  Cough and chest congestion  Symptom intensity:  Moderate  Symptom progression:  Staying the same  Had these symptoms before:  No  What makes it worse:  No   She is taking medications regularly.     She has had URI symptoms in the last 2 to 3 days with a cough and chest congestion.  No fever.  No head congestion.  Her's  had similar symptoms last week and went in and was tested for influenza, COVID,  "and RSV which were all negative.  He eventually got an inhaler and some antibiotics for his infection.  He is getting better now.  The patient has no history of asthma or COPD or smoking.  She is mainly wanting to try to keep this from progressing into something \"more\".    Review of her chart shows that she is due for a 5-year follow-up colonoscopy next month.  She is open to getting that done.    Patient Active Problem List   Diagnosis    Eczema    Chronic rhinitis    Seasonal allergies    Family history of ischemic heart disease    Legal blindness, ou    Thyroid nodules    Obesity (BMI 35.0-39.9 without comorbidity)    S/P partial thyroidectomy    History of colonic polyps    Morbid obesity (H)     Current Outpatient Medications   Medication Sig Dispense Refill    Acetaminophen (TYLENOL PO)       triamcinolone (KENALOG) 0.1 % external cream Apply topically 2 times daily 15 g 3     No current facility-administered medications for this visit.           Review of Systems  She is due for an annual physical again in a month.  She had a mammogram last year and plans to do those every 2 years and she has no family history of breast cancer.      Objective           Vitals:  No vitals were obtained today due to virtual visit.    Physical Exam   GENERAL: alert and no distress  EYES: She is vision impaired  RESP: No audible wheeze, cough, or visible cyanosis.    SKIN: Visible skin clear. No significant rash, abnormal pigmentation or lesions.  NEURO: Cranial nerves grossly intact.  Mentation and speech appropriate for age.  PSYCH: Appropriate affect, tone, and pace of words          Video-Visit Details    Type of service:  Video Visit   Originating Location (pt. Location): Home    Distant Location (provider location):  On-site  Platform used for Video Visit: [employer]  Signed Electronically by: Aneesh Castelan MD    "

## 2025-01-30 ENCOUNTER — TELEPHONE (OUTPATIENT)
Dept: GASTROENTEROLOGY | Facility: CLINIC | Age: 55
End: 2025-01-30
Payer: COMMERCIAL

## 2025-01-30 ENCOUNTER — HOSPITAL ENCOUNTER (OUTPATIENT)
Facility: AMBULATORY SURGERY CENTER | Age: 55
End: 2025-01-30
Attending: COLON & RECTAL SURGERY
Payer: COMMERCIAL

## 2025-01-30 NOTE — TELEPHONE ENCOUNTER
"Endoscopy Scheduling Screen    Have you had any respiratory illness or flu-like symptoms in the last 10 days?  No    What is your communication preference for Instructions and/or Bowel Prep?   MyChart    What insurance is in the chart?  Other:  [facility]    Ordering/Referring Provider: LAKSHMI MCLAIN   (If ordering provider performs procedure, schedule with ordering provider unless otherwise instructed. )    BMI: Estimated body mass index is 40.68 kg/m  as calculated from the following:    Height as of 2/20/24: 1.626 m (5' 4\").    Weight as of 2/20/24: 107.5 kg (237 lb).     Sedation Ordered  moderate sedation.   If patient BMI > 50 do not schedule in [facility].    If patient BMI > 45 do not schedule at [facility].    Are you taking methadone or Suboxone?  NO, No RN review required.    Have you been diagnosed and are being treated for severe PTSD or severe anxiety?  NO, No RN review required.    Are you taking any prescription medications for pain 3 or more times per week?   NO, No RN review required.    Do you have a history of malignant hyperthermia?  No    (Females) Are you currently pregnant?   No     Have you been diagnosed or told you have pulmonary hypertension?   No    Do you have an LVAD?  No    Have you been told you have moderate to severe sleep apnea?  No.    Have you been told you have COPD, asthma, or any other lung disease?  No    Do you have any heart conditions?  No     Have you ever had or are you waiting for an organ transplant?  No. Continue scheduling, no site restrictions.    Have you had a stroke or transient ischemic attack (TIA aka \"mini stroke\" in the last 6 months?   No    Have you been diagnosed with or been told you have cirrhosis of the liver?   No.    Are you currently on dialysis?   No    Do you need assistance transferring?   No    BMI: Estimated body mass index is 40.68 kg/m  as calculated from the following:    Height as of 2/20/24: 1.626 m (5' 4\").    Weight as of 2/20/24: 107.5 kg (237 lb). "     Is patients BMI > 40 and scheduling location [facility]?  No    Do you take an injectable or oral medication for weight loss or diabetes (excluding insulin)?  No    Do you take the medication Naltrexone?  No    Do you take blood thinners?  No       Prep   Are you currently on dialysis or do you have chronic kidney disease?  No    Do you have a diagnosis of diabetes?  No    Do you have a diagnosis of cystic fibrosis (CF)?  No    On a regular basis do you go 3 -5 days between bowel movements?  No    BMI > 40?  Yes (Extended Prep)    Preferred Pharmacy:    [id number] - [place] - [address] AT [address]  [address]  [place] [address]  Phone: [phone] Fax: [phone]    Final Scheduling Details     Procedure scheduled  Colonoscopy    Surgeon:  SAMANTHA     Date of procedure:  4/4/25     Pre-OP / PAC:   No - Not required for this site.    Location  [facility] - Per order.    Sedation   Moderate Sedation - Per order.      Patient Reminders:   You will receive a call from a Nurse to review instructions and health history.  This assessment must be completed prior to your procedure.  Failure to complete the Nurse assessment may result in the procedure being cancelled.      On the day of your procedure, please designate an adult(s) who can drive you home stay with you for the next 24 hours. The medicines used in the exam will make you sleepy. You will not be able to drive.      You cannot take public transportation, ride share services, or non-medical taxi service without a responsible caregiver.  Medical transport services are allowed with the requirement that a responsible caregiver will receive you at your destination.  We require that drivers and caregivers are confirmed prior to your procedure.

## 2025-03-23 ENCOUNTER — HEALTH MAINTENANCE LETTER (OUTPATIENT)
Age: 55
End: 2025-03-23

## 2025-05-04 ENCOUNTER — HEALTH MAINTENANCE LETTER (OUTPATIENT)
Age: 55
End: 2025-05-04